# Patient Record
Sex: FEMALE | Race: BLACK OR AFRICAN AMERICAN | NOT HISPANIC OR LATINO | Employment: OTHER | URBAN - METROPOLITAN AREA
[De-identification: names, ages, dates, MRNs, and addresses within clinical notes are randomized per-mention and may not be internally consistent; named-entity substitution may affect disease eponyms.]

---

## 2018-01-02 ENCOUNTER — HOSPITAL ENCOUNTER (EMERGENCY)
Facility: HOSPITAL | Age: 73
Discharge: HOME | End: 2018-01-02
Attending: EMERGENCY MEDICINE
Payer: MEDICARE

## 2018-01-02 VITALS
DIASTOLIC BLOOD PRESSURE: 81 MMHG | HEART RATE: 63 BPM | SYSTOLIC BLOOD PRESSURE: 132 MMHG | TEMPERATURE: 99 F | OXYGEN SATURATION: 99 % | RESPIRATION RATE: 20 BRPM

## 2018-01-02 DIAGNOSIS — M43.6 RIGHT TORTICOLLIS: Primary | ICD-10-CM

## 2018-01-02 PROCEDURE — 96372 THER/PROPH/DIAG INJ SC/IM: CPT

## 2018-01-02 PROCEDURE — 63600175 PHARM REV CODE 636 W HCPCS: Performed by: EMERGENCY MEDICINE

## 2018-01-02 PROCEDURE — 99284 EMERGENCY DEPT VISIT MOD MDM: CPT | Mod: 25

## 2018-01-02 RX ORDER — NAPROXEN 500 MG/1
500 TABLET ORAL 2 TIMES DAILY PRN
Qty: 14 TABLET | Refills: 0 | Status: SHIPPED | OUTPATIENT
Start: 2018-01-02 | End: 2018-01-09

## 2018-01-02 RX ORDER — KETOROLAC TROMETHAMINE 30 MG/ML
30 INJECTION, SOLUTION INTRAMUSCULAR; INTRAVENOUS
Status: COMPLETED | OUTPATIENT
Start: 2018-01-02 | End: 2018-01-02

## 2018-01-02 RX ORDER — ORPHENADRINE CITRATE 100 MG/1
100 TABLET, EXTENDED RELEASE ORAL 2 TIMES DAILY PRN
Qty: 14 TABLET | Refills: 0 | Status: SHIPPED | OUTPATIENT
Start: 2018-01-02 | End: 2018-01-09

## 2018-01-02 RX ORDER — ORPHENADRINE CITRATE 30 MG/ML
60 INJECTION INTRAMUSCULAR; INTRAVENOUS
Status: COMPLETED | OUTPATIENT
Start: 2018-01-02 | End: 2018-01-02

## 2018-01-02 RX ADMIN — KETOROLAC TROMETHAMINE 30 MG: 30 INJECTION, SOLUTION INTRAMUSCULAR at 01:01

## 2018-01-02 RX ADMIN — ORPHENADRINE CITRATE 60 MG: 30 INJECTION INTRAMUSCULAR; INTRAVENOUS at 01:01

## 2018-01-02 NOTE — ED PROVIDER NOTES
Encounter Date: 1/2/2018       History     Chief Complaint   Patient presents with    Neck Pain     x 1 week; has not taken any medication for her pain     Right lateral neck pain and stiffness on awakening a few days ago, has been spasmed and tender ever since.  Has not taken any medications.  No neurologic symptoms.  No trauma.  He denies fall, motor vehicle accident, etc.  He has had similar episodes in the past.  No other complaints.  Here by ambulance, has family coming to pick her up.      The history is provided by the patient and the EMS personnel. No  was used.     Review of patient's allergies indicates:   Allergen Reactions    Acidophilus-pectin, citrus Itching    Codeine Itching     Past Medical History:   Diagnosis Date    Depression     GERD (gastroesophageal reflux disease)     HLD (hyperlipidemia)     Hypertension      Past Surgical History:   Procedure Laterality Date    APPENDECTOMY      CARPAL TUNNEL RELEASE      HYSTERECTOMY      SHOULDER SURGERY       Family History   Problem Relation Age of Onset    Colon cancer Neg Hx      Social History   Substance Use Topics    Smoking status: Never Smoker    Smokeless tobacco: Not on file    Alcohol use No     Review of Systems   Musculoskeletal: Positive for neck pain and neck stiffness.       Physical Exam     Initial Vitals [01/02/18 0059]   BP Pulse Resp Temp SpO2   (!) 158/85 65 20 99 °F (37.2 °C) 98 %      MAP       109.33         Physical Exam    Nursing note and vitals reviewed.  Constitutional: She appears well-developed and well-nourished. She is not diaphoretic. No distress.   HENT:   Head: Normocephalic and atraumatic.   Mouth/Throat: No oropharyngeal exudate.   Eyes: Conjunctivae and EOM are normal. Pupils are equal, round, and reactive to light. Right eye exhibits no discharge. Left eye exhibits no discharge. No scleral icterus.   Neck: Normal range of motion. Neck supple. No thyromegaly present. No tracheal  deviation present. No JVD present.   Cardiovascular: Normal rate, regular rhythm, normal heart sounds and intact distal pulses. Exam reveals no gallop and no friction rub.    No murmur heard.  Pulmonary/Chest: Breath sounds normal. No stridor. No respiratory distress. She has no wheezes. She has no rhonchi. She has no rales. She exhibits no tenderness.   Abdominal: Soft. Bowel sounds are normal. She exhibits no distension and no mass. There is no tenderness. There is no rebound and no guarding.   Musculoskeletal: She exhibits tenderness. She exhibits no edema.   Moderate right lateral cervical muscular spasm & tenderness with associated decreased ROM due to pain & spasm; no other findings.   Neurological: She is alert and oriented to person, place, and time. She has normal strength.   Skin: Skin is warm and dry. No rash and no abscess noted. No erythema.   Psychiatric: She has a normal mood and affect. Her behavior is normal. Judgment and thought content normal.         ED Course   Procedures  Labs Reviewed - No data to display                            ED Course      Clinical Impression:     1. Right torticollis          Disposition:   Disposition: Discharged  Condition: Stable                        Keshawn Hobbs MD  01/02/18 0110

## 2018-01-02 NOTE — ED NOTES
LOC: The patient is awake, alert and aware of environment with an appropriate affect, the patient is oriented x 3 and speaking appropriately.  APPEARANCE: Patient resting comfortably and in no acute distress, patient is clean and well groomed, patient's clothing is properly fastened.  HEENT: Brief WNL  SKIN: Brief WNL.   MUSCULOSKELETAL: Brief WNL. stiiffness to neck pain with movement. Pt ambulating from stretcher to room without difficulty  RESPIRATORY: Brief WNL  CARDIAC: Brief WNL  GASTRO: Brief WNL  : Brief WNL  Peripheral Vasc: Brief WNL  NEURO: Brief WNL. aaox3 perrla  PSYCH: Brief WNL

## 2018-06-20 ENCOUNTER — TELEPHONE (OUTPATIENT)
Dept: RADIOLOGY | Facility: HOSPITAL | Age: 73
End: 2018-06-20

## 2018-06-21 ENCOUNTER — HOSPITAL ENCOUNTER (OUTPATIENT)
Dept: RADIOLOGY | Facility: HOSPITAL | Age: 73
Discharge: HOME | End: 2018-06-21
Attending: FAMILY MEDICINE
Payer: MEDICARE

## 2018-06-21 DIAGNOSIS — R10.9 STOMACH ACHE: Primary | ICD-10-CM

## 2018-06-21 DIAGNOSIS — R10.9 STOMACH ACHE: ICD-10-CM

## 2018-06-21 PROCEDURE — 76700 US EXAM ABDOM COMPLETE: CPT | Mod: TC,PO

## 2018-06-21 PROCEDURE — 76700 US EXAM ABDOM COMPLETE: CPT | Mod: 26,,, | Performed by: RADIOLOGY

## 2019-09-30 ENCOUNTER — OFFICE VISIT (OUTPATIENT)
Dept: GASTROENTEROLOGY | Facility: CLINIC | Age: 74
End: 2019-09-30
Payer: MEDICARE

## 2019-09-30 VITALS
DIASTOLIC BLOOD PRESSURE: 70 MMHG | SYSTOLIC BLOOD PRESSURE: 130 MMHG | HEIGHT: 60 IN | WEIGHT: 148.38 LBS | BODY MASS INDEX: 29.13 KG/M2 | HEART RATE: 59 BPM

## 2019-09-30 DIAGNOSIS — K59.04 CHRONIC IDIOPATHIC CONSTIPATION: ICD-10-CM

## 2019-09-30 DIAGNOSIS — Z12.11 COLON CANCER SCREENING: Primary | ICD-10-CM

## 2019-09-30 DIAGNOSIS — Z86.010 HISTORY OF COLON POLYPS: ICD-10-CM

## 2019-09-30 PROCEDURE — 99213 OFFICE O/P EST LOW 20 MIN: CPT | Mod: PBBFAC | Performed by: NURSE PRACTITIONER

## 2019-09-30 PROCEDURE — 99999 PR PBB SHADOW E&M-EST. PATIENT-LVL III: CPT | Mod: PBBFAC,,, | Performed by: NURSE PRACTITIONER

## 2019-09-30 PROCEDURE — 99999 PR PBB SHADOW E&M-EST. PATIENT-LVL III: ICD-10-PCS | Mod: PBBFAC,,, | Performed by: NURSE PRACTITIONER

## 2019-09-30 PROCEDURE — 99203 PR OFFICE/OUTPT VISIT, NEW, LEVL III, 30-44 MIN: ICD-10-PCS | Mod: S$PBB,,, | Performed by: NURSE PRACTITIONER

## 2019-09-30 PROCEDURE — 99203 OFFICE O/P NEW LOW 30 MIN: CPT | Mod: S$PBB,,, | Performed by: NURSE PRACTITIONER

## 2019-09-30 RX ORDER — MELOXICAM 7.5 MG/1
7.5 TABLET ORAL 2 TIMES DAILY
COMMUNITY
End: 2019-12-19 | Stop reason: SDUPTHER

## 2019-09-30 RX ORDER — NAPROXEN SODIUM 220 MG/1
81 TABLET, FILM COATED ORAL DAILY
COMMUNITY

## 2019-09-30 RX ORDER — ISOSORBIDE MONONITRATE 30 MG/1
30 TABLET, EXTENDED RELEASE ORAL DAILY
COMMUNITY
End: 2019-12-04 | Stop reason: SDUPTHER

## 2019-09-30 RX ORDER — OMEPRAZOLE 20 MG/1
20 CAPSULE, DELAYED RELEASE ORAL DAILY
COMMUNITY
End: 2019-11-30 | Stop reason: SDUPTHER

## 2019-09-30 NOTE — PROGRESS NOTES
Clinic Consult:  [facility] Consultation Note    Reason for Consult:  The primary encounter diagnosis was Colon cancer screening. Diagnoses of History of colon polyps and Chronic idiopathic constipation were also pertinent to this visit.    PCP: Carlton Calderon   [id number] [facility] / [place]*    HPI:  This is a 74 y.o. female here for evaluation of the above. She presents to clinic needing screening colonoscopy. Her last colonoscopy was done in 2012. Findings of internal hemorrhoids, diverticulosis in the sigmoid colon, a 3 mm polyp, 13 mm polyp, and a benign tumor (likely polyp) at the appendiceal orifice. This was subsequently surgically excised and found to be benign. Recall recommended in 3 years. She also has chronic constipation. She is taking Miralax every other day but is not well controlled.     Review of Systems   Constitutional: Negative for fever, malaise/fatigue and weight loss.   HENT: Negative for sore throat.    Respiratory: Negative for cough and wheezing.    Cardiovascular: Negative for chest pain and palpitations.   Gastrointestinal: Positive for constipation. Negative for abdominal pain, blood in stool, diarrhea, heartburn, melena, nausea and vomiting.   Genitourinary: Negative for dysuria and frequency.   Musculoskeletal: Negative for back pain, joint pain, myalgias and neck pain.   Skin: Negative for itching and rash.   Neurological: Negative for dizziness, speech change, seizures, loss of consciousness and headaches.   Psychiatric/Behavioral: Negative for depression and substance abuse. The patient is not nervous/anxious.        Medical History:  has a past medical history of Depression, GERD (gastroesophageal reflux disease), HLD (hyperlipidemia), and Hypertension.    Surgical History:  has a past surgical history that includes Hysterectomy; Appendectomy; Carpal tunnel release; and Shoulder surgery.    Family History: family history is not on file..      Social History:  reports that she has never smoked. She does not have any smokeless tobacco history on file. She reports that she does not drink alcohol or use drugs.    Allergies: Reviewed    Home Medications:   Current Outpatient Medications on File Prior to Visit   Medication Sig Dispense Refill    aluminum-magnesium hydroxide-simethicone (MAALOX) 200-200-20 mg/5 mL Susp Take 30 mLs by mouth 4 (four) times daily before meals and nightly.      aspirin 81 MG Chew Take 81 mg by mouth once daily.      atorvastatin (LIPITOR) 20 MG tablet Take 20 mg by mouth every evening.      fluticasone (VERAMYST) 27.5 mcg/actuation nasal spray 2 sprays by Nasal route once daily.      isosorbide mononitrate (IMDUR) 30 MG 24 hr tablet Take 30 mg by mouth once daily.      latanoprost 0.005 % ophthalmic solution 1 drop every evening.      meloxicam (MOBIC) 7.5 MG tablet Take 7.5 mg by mouth 2 (two) times daily.      omeprazole (PRILOSEC) 20 MG capsule Take 20 mg by mouth once daily.      paroxetine (PAXIL) 20 MG tablet Take 20 mg by mouth every morning.      valsartan (DIOVAN) 80 MG tablet Take 80 mg by mouth once daily.      verapamil (VERELAN) 240 MG C24P Take 240 mg by mouth once daily.      pantoprazole (PROTONIX) 40 MG tablet Take 1 tablet (40 mg total) by mouth before breakfast. Take every morning on an empty stomach 30 mins before eating breakfast 30 tablet 11     No current facility-administered medications on file prior to visit.        Physical Exam:  /70   Pulse (!) 59   Ht 5' (1.524 m)   Wt 67.3 kg (148 lb 5.9 oz)   BMI 28.98 kg/m²   Body mass index is 28.98 kg/m².  Physical Exam   Constitutional: She is oriented to person, place, and time and well-developed, well-nourished, and in no distress. No distress.   HENT:   Head: Normocephalic.   Eyes: Pupils are equal, round, and reactive to light. Conjunctivae are normal.   Cardiovascular: Normal rate, regular rhythm and normal heart sounds.    Pulmonary/Chest: Effort normal and breath sounds normal. No respiratory distress.   Abdominal: Soft. Bowel sounds are normal. She exhibits no distension. There is no tenderness.   Neurological: She is alert and oriented to person, place, and time. No cranial nerve deficit.   Skin: Skin is warm and dry. No rash noted.   Psychiatric: Mood and affect normal.       Labs: Pertinent labs reviewed.    Assessment:  1. Colon cancer screening    2. History of colon polyps    3. Chronic idiopathic constipation         Recommendations:  Colon cancer screening  History of colon polyps  - due for repeat screening  -     Case request GI: COLONOSCOPY    Chronic idiopathic constipation  - increase Miralax frequency to daily. Can increase to twice a day if needed.     Follow up to be determined after procedure.    Thank you so much for allowing me to participate in the care of KIM Scherer

## 2019-09-30 NOTE — LETTER
September 30, 2019      Carlton Calderon MD  [address] [facility] [place] [address]           [facility]  [id number] [place] [id number]  Phone: [phone]  Fax: [phone]          Patient: Odalis Rosas   MR Number: [id number]   YOB: 1945   Date of Visit: 9/30/2019       Dear Dr. Carlton Calderon:    Thank you for referring Oadlis Rosas to me for evaluation. Attached you will find relevant portions of my assessment and plan of care.    If you have questions, please do not hesitate to call me. I look forward to following Odalis Rosas along with you.    Sincerely,    Mayra Jaimes, DOMONIQUE    Enclosure  CC:  No Recipients    If you would like to receive this communication electronically, please contact [email or web address] or [phone] to request more information on [employer] access.    For providers and/or their staff who would like to refer a patient to [facility], please contact us through our one-stop-shop provider referral line, [facility] , at [phone].    If you feel you have received this communication in error or would no longer like to receive these types of communications, please e-mail [email or web address]

## 2019-10-09 ENCOUNTER — HOSPITAL ENCOUNTER (OUTPATIENT)
Dept: RADIOLOGY | Facility: HOSPITAL | Age: 74
Discharge: HOME | End: 2019-10-09
Attending: FAMILY MEDICINE
Payer: MEDICARE

## 2019-10-09 VITALS — HEIGHT: 60 IN | WEIGHT: 147.69 LBS | BODY MASS INDEX: 28.99 KG/M2

## 2019-10-09 DIAGNOSIS — Z12.31 ENCOUNTER FOR SCREENING MAMMOGRAM FOR MALIGNANT NEOPLASM OF BREAST: ICD-10-CM

## 2019-10-09 PROCEDURE — 77067 MAMMO DIGITAL SCREENING BILAT WITH TOMOSYNTHESIS_CAD: ICD-10-PCS | Mod: 26,,, | Performed by: RADIOLOGY

## 2019-10-09 PROCEDURE — 77067 SCR MAMMO BI INCL CAD: CPT | Mod: TC,PO

## 2019-10-09 PROCEDURE — 77067 SCR MAMMO BI INCL CAD: CPT | Mod: 26,,, | Performed by: RADIOLOGY

## 2019-10-09 PROCEDURE — 77063 MAMMO DIGITAL SCREENING BILAT WITH TOMOSYNTHESIS_CAD: ICD-10-PCS | Mod: 26,,, | Performed by: RADIOLOGY

## 2019-10-09 PROCEDURE — 77063 BREAST TOMOSYNTHESIS BI: CPT | Mod: 26,,, | Performed by: RADIOLOGY

## 2019-11-06 ENCOUNTER — TELEPHONE (OUTPATIENT)
Dept: ENDOSCOPY | Facility: HOSPITAL | Age: 74
End: 2019-11-06

## 2019-11-06 NOTE — TELEPHONE ENCOUNTER
Attempted to call pt in regards to her colonoscopy scheduled for 11/27/19. Due to an endo schedule change, she will need to be moved to Dr. Aragon schedule that day or rescheduled. No answer. No vm option.

## 2019-11-09 ENCOUNTER — TELEPHONE (OUTPATIENT)
Dept: ENDOSCOPY | Facility: HOSPITAL | Age: 74
End: 2019-11-09

## 2019-11-14 ENCOUNTER — TELEPHONE (OUTPATIENT)
Dept: ENDOSCOPY | Facility: HOSPITAL | Age: 74
End: 2019-11-14

## 2019-11-14 NOTE — TELEPHONE ENCOUNTER
Patient called office to reschedule procedure, d/t endoscopy schedule on 11-.  Rescheduled procedure to 1-.  New instructions mailed to address on file.

## 2019-11-27 RX ORDER — SODIUM, POTASSIUM,MAG SULFATES 17.5-3.13G
SOLUTION, RECONSTITUTED, ORAL ORAL
Qty: 354 ML | Refills: 0 | Status: ON HOLD | OUTPATIENT
Start: 2019-11-27 | End: 2019-12-04 | Stop reason: HOSPADM

## 2019-11-30 RX ORDER — OMEPRAZOLE 20 MG/1
CAPSULE, DELAYED RELEASE ORAL
Qty: 90 CAPSULE | Refills: 1 | Status: SHIPPED | OUTPATIENT
Start: 2019-11-30 | End: 2020-02-06 | Stop reason: SDUPTHER

## 2019-12-04 ENCOUNTER — ANESTHESIA (OUTPATIENT)
Dept: ENDOSCOPY | Facility: HOSPITAL | Age: 74
End: 2019-12-04
Payer: MEDICARE

## 2019-12-04 ENCOUNTER — HOSPITAL ENCOUNTER (OUTPATIENT)
Facility: HOSPITAL | Age: 74
Discharge: HOME | End: 2019-12-04
Attending: INTERNAL MEDICINE | Admitting: INTERNAL MEDICINE
Payer: MEDICARE

## 2019-12-04 ENCOUNTER — ANESTHESIA EVENT (OUTPATIENT)
Dept: ENDOSCOPY | Facility: HOSPITAL | Age: 74
End: 2019-12-04
Payer: MEDICARE

## 2019-12-04 VITALS
OXYGEN SATURATION: 100 % | BODY MASS INDEX: 28.82 KG/M2 | HEART RATE: 62 BPM | TEMPERATURE: 98 F | WEIGHT: 146.81 LBS | RESPIRATION RATE: 17 BRPM | HEIGHT: 60 IN | DIASTOLIC BLOOD PRESSURE: 92 MMHG | SYSTOLIC BLOOD PRESSURE: 128 MMHG

## 2019-12-04 DIAGNOSIS — Z86.010 PERSONAL HISTORY OF COLONIC POLYPS: Primary | ICD-10-CM

## 2019-12-04 PROBLEM — Z86.0100 PERSONAL HISTORY OF COLONIC POLYPS: Status: ACTIVE | Noted: 2019-12-04

## 2019-12-04 LAB — POCT GLUCOSE: 94 MG/DL (ref 70–110)

## 2019-12-04 PROCEDURE — 63600175 PHARM REV CODE 636 W HCPCS: Performed by: INTERNAL MEDICINE

## 2019-12-04 PROCEDURE — G0105 COLORECTAL SCRN; HI RISK IND: HCPCS | Performed by: INTERNAL MEDICINE

## 2019-12-04 PROCEDURE — 37000009 HC ANESTHESIA EA ADD 15 MINS: Performed by: INTERNAL MEDICINE

## 2019-12-04 PROCEDURE — G0105 COLORECTAL SCRN; HI RISK IND: HCPCS | Mod: ,,, | Performed by: INTERNAL MEDICINE

## 2019-12-04 PROCEDURE — G0105 COLORECTAL SCRN; HI RISK IND: ICD-10-PCS | Mod: ,,, | Performed by: INTERNAL MEDICINE

## 2019-12-04 PROCEDURE — 82962 GLUCOSE BLOOD TEST: CPT | Performed by: INTERNAL MEDICINE

## 2019-12-04 PROCEDURE — 63600175 PHARM REV CODE 636 W HCPCS: Performed by: NURSE ANESTHETIST, CERTIFIED REGISTERED

## 2019-12-04 PROCEDURE — 37000008 HC ANESTHESIA 1ST 15 MINUTES: Performed by: INTERNAL MEDICINE

## 2019-12-04 PROCEDURE — 25000003 PHARM REV CODE 250: Performed by: NURSE ANESTHETIST, CERTIFIED REGISTERED

## 2019-12-04 RX ORDER — GABAPENTIN 100 MG/1
CAPSULE ORAL
Qty: 90 CAPSULE | Refills: 1 | Status: SHIPPED | OUTPATIENT
Start: 2019-12-04 | End: 2020-11-05

## 2019-12-04 RX ORDER — LIDOCAINE HYDROCHLORIDE 10 MG/ML
INJECTION, SOLUTION EPIDURAL; INFILTRATION; INTRACAUDAL; PERINEURAL
Status: DISCONTINUED | OUTPATIENT
Start: 2019-12-04 | End: 2019-12-04

## 2019-12-04 RX ORDER — PROPOFOL 10 MG/ML
VIAL (ML) INTRAVENOUS
Status: DISCONTINUED | OUTPATIENT
Start: 2019-12-04 | End: 2019-12-04

## 2019-12-04 RX ORDER — SODIUM CHLORIDE 0.9 % (FLUSH) 0.9 %
10 SYRINGE (ML) INJECTION
Status: DISCONTINUED | OUTPATIENT
Start: 2019-12-04 | End: 2019-12-04 | Stop reason: HOSPADM

## 2019-12-04 RX ORDER — SODIUM CHLORIDE, SODIUM LACTATE, POTASSIUM CHLORIDE, CALCIUM CHLORIDE 600; 310; 30; 20 MG/100ML; MG/100ML; MG/100ML; MG/100ML
INJECTION, SOLUTION INTRAVENOUS CONTINUOUS
Status: DISCONTINUED | OUTPATIENT
Start: 2019-12-04 | End: 2019-12-04 | Stop reason: HOSPADM

## 2019-12-04 RX ORDER — LEVOCETIRIZINE DIHYDROCHLORIDE 5 MG/1
TABLET, FILM COATED ORAL
Qty: 90 TABLET | Refills: 1 | Status: SHIPPED | OUTPATIENT
Start: 2019-12-04 | End: 2020-04-03 | Stop reason: SDUPTHER

## 2019-12-04 RX ORDER — ISOSORBIDE MONONITRATE 30 MG/1
TABLET, EXTENDED RELEASE ORAL
Qty: 90 TABLET | Refills: 1 | Status: SHIPPED | OUTPATIENT
Start: 2019-12-04 | End: 2020-04-03 | Stop reason: SDUPTHER

## 2019-12-04 RX ADMIN — PROPOFOL 20 MG: 10 INJECTION, EMULSION INTRAVENOUS at 07:12

## 2019-12-04 RX ADMIN — PROPOFOL 20 MG: 10 INJECTION, EMULSION INTRAVENOUS at 06:12

## 2019-12-04 RX ADMIN — SODIUM CHLORIDE, SODIUM LACTATE, POTASSIUM CHLORIDE, AND CALCIUM CHLORIDE: 600; 310; 30; 20 INJECTION, SOLUTION INTRAVENOUS at 06:12

## 2019-12-04 RX ADMIN — PROPOFOL 50 MG: 10 INJECTION, EMULSION INTRAVENOUS at 06:12

## 2019-12-04 RX ADMIN — LIDOCAINE HYDROCHLORIDE 50 MG: 10 INJECTION, SOLUTION EPIDURAL; INFILTRATION; INTRACAUDAL; PERINEURAL at 06:12

## 2019-12-04 NOTE — TRANSFER OF CARE
Anesthesia Transfer of Care Note    Patient: Odalis Rosas    Procedure(s) Performed: Procedure(s) (LRB):  COLONOSCOPY (N/A)    Patient location: GI    Anesthesia Type: MAC    Transport from OR: Transported from OR on room air with adequate spontaneous ventilation    Post pain: adequate analgesia    Post assessment: no apparent anesthetic complications    Post vital signs: stable    Level of consciousness: awake    Nausea/Vomiting: no nausea/vomiting    Complications: none    Transfer of care protocol was followed      Last vitals:   Visit Vitals  /83 (BP Location: Left arm, Patient Position: Lying)   Pulse (!) 56   Resp 18   Ht 5' (1.524 m)   Wt 66.6 kg (146 lb 13.2 oz)   SpO2 100%   Breastfeeding? No   BMI 28.68 kg/m²

## 2019-12-04 NOTE — ANESTHESIA RELEASE NOTE
Anesthesia Release from PACU Note    Patient: Odalis Rosas    Procedure(s) Performed: Procedure(s) (LRB):  COLONOSCOPY (N/A)    Anesthesia type: MAC    Post pain: Adequate analgesia    Post assessment: no apparent anesthetic complications    Last Vitals:   Visit Vitals  /83 (BP Location: Left arm, Patient Position: Lying)   Pulse (!) 56   Resp 18   Ht 5' (1.524 m)   Wt 66.6 kg (146 lb 13.2 oz)   SpO2 100%   Breastfeeding? No   BMI 28.68 kg/m²       Post vital signs: stable    Level of consciousness: awake    Nausea/Vomiting: no nausea/no vomiting    Complications: none    Airway Patency: patent    Respiratory: unassisted    Cardiovascular: stable and blood pressure at baseline    Hydration: euvolemic

## 2019-12-04 NOTE — DISCHARGE INSTRUCTIONS

## 2019-12-04 NOTE — H&P
Short Stay Endoscopy History and Physical    PCP - Carlton Calderon MD    Procedure - Colonoscopy  ASA - 2  Mallampati - per anesthesia  History of Anesthesia problems - no  Family history Anesthesia problems -  no     HPI:  This is a 74 y.o. female here for evaluation of :   Active Hospital Problems    Diagnosis  POA    *Personal history of colonic polyps [Z86.010]  Not Applicable      Resolved Hospital Problems   No resolved problems to display.         Health Maintenance       Date Due Completion Date    Hepatitis C Screening 1945 ---    Lipid Panel 1945 ---    TETANUS VACCINE 09/26/1965 ---    DEXA SCAN 09/26/1987 ---    Shingles Vaccine (1 of 2) 09/26/1997 ---    Pneumococcal Vaccine (65+ Low/Medium Risk) (1 of 2 - PCV13) 09/26/2012 ---    Colonoscopy 07/13/2019 7/13/2016    Mammogram 10/09/2021 10/9/2019          Screening - no  History of polyps - yes  Diarrhea - no  Anemia - no  Blood in stools - no  Abdominal pain - no  Family History of Colon Cancer- no  Other - no    ROS:  CONSTITUTIONAL: Denies weight change,  fatigue, fevers, chills, night sweats.  CARDIOVASCULAR: Denies chest pain, shortness of breath, orthopnea and edema.  RESPIRATORY: Denies cough, hemoptysis, dyspnea, and wheezing.  GI: See HPI.    Medical History:   Past Medical History:   Diagnosis Date    Depression     GERD (gastroesophageal reflux disease)     HLD (hyperlipidemia)     Hypertension        Surgical History:   Past Surgical History:   Procedure Laterality Date    APPENDECTOMY      BREAST BIOPSY      CARPAL TUNNEL RELEASE      HYSTERECTOMY      SHOULDER SURGERY         Family History:   Family History   Problem Relation Age of Onset    Colon cancer Neg Hx        Social History:   Social History     Tobacco Use    Smoking status: Never Smoker    Smokeless tobacco: Never Used   Substance Use Topics    Alcohol use: No    Drug use: No       Allergies:   Review of patient's allergies indicates:   Allergen  Reactions    Acidophilus-pectin, citrus Itching    Codeine Itching       Medications:   No current facility-administered medications on file prior to encounter.      Current Outpatient Medications on File Prior to Encounter   Medication Sig Dispense Refill    aluminum-magnesium hydroxide-simethicone (MAALOX) 200-200-20 mg/5 mL Susp Take 30 mLs by mouth 4 (four) times daily before meals and nightly.      aspirin 81 MG Chew Take 81 mg by mouth once daily.      atorvastatin (LIPITOR) 20 MG tablet Take 20 mg by mouth every evening.      fluticasone (VERAMYST) 27.5 mcg/actuation nasal spray 2 sprays by Nasal route once daily.      isosorbide mononitrate (IMDUR) 30 MG 24 hr tablet Take 30 mg by mouth once daily.      latanoprost 0.005 % ophthalmic solution 1 drop every evening.      paroxetine (PAXIL) 20 MG tablet Take 20 mg by mouth every morning.      valsartan (DIOVAN) 80 MG tablet Take 80 mg by mouth once daily.      verapamil (VERELAN) 240 MG C24P Take 240 mg by mouth once daily.      meloxicam (MOBIC) 7.5 MG tablet Take 7.5 mg by mouth 2 (two) times daily.      pantoprazole (PROTONIX) 40 MG tablet Take 1 tablet (40 mg total) by mouth before breakfast. Take every morning on an empty stomach 30 mins before eating breakfast 30 tablet 11       Physical Exam:  Vital Signs:   Vitals:    12/04/19 0636   BP: 129/83   Pulse: (!) 56   Resp: 18     General Appearance: Well appearing in no acute distress  ENT: OP clear  Chest: CTA B  CV: RRR, no m/r/g  Abd: s/nt/nd/nabs  Ext: no edema    Labs:Reviewed    IMP:  Active Hospital Problems    Diagnosis  POA    *Personal history of colonic polyps [Z86.010]  Not Applicable      Resolved Hospital Problems   No resolved problems to display.         Plan:   I have explained the risks and benefits of colonoscopy to the patient including but not limited to bleeding, perforation, infection, and death. The patient wishes to proceed.

## 2019-12-04 NOTE — DISCHARGE SUMMARY
Endoscopy Discharge Summary      Admit Date: 12/4/2019    Discharge Date and Time:  12/4/2019 7:12 AM    Attending Physician: Lyubov Root MD     Discharge Physician: Lyubov Root MD     Principal Admitting Diagnoses: Personal history of colonic polyps         Discharge Diagnosis: The encounter diagnosis was Personal history of colonic polyps.     Discharged Condition: Good    Indication for Admission: Personal history of colonic polyps     Hospital Course: Patient was admitted for an inpatient procedure and tolerated the procedure well with no complications.    Significant Diagnostic Studies: Colonoscopy    Pathology (if any):  Specimen (12h ago, onward)    None          Estimated Blood Loss: 0 ml.    Discussed with: patient.    Disposition: Home.    Follow Up/Patient Instructions:   Current Discharge Medication List      CONTINUE these medications which have NOT CHANGED    Details   aluminum-magnesium hydroxide-simethicone (MAALOX) 200-200-20 mg/5 mL Susp Take 30 mLs by mouth 4 (four) times daily before meals and nightly.      aspirin 81 MG Chew Take 81 mg by mouth once daily.      atorvastatin (LIPITOR) 20 MG tablet Take 20 mg by mouth every evening.      fluticasone (VERAMYST) 27.5 mcg/actuation nasal spray 2 sprays by Nasal route once daily.      isosorbide mononitrate (IMDUR) 30 MG 24 hr tablet Take 30 mg by mouth once daily.      latanoprost 0.005 % ophthalmic solution 1 drop every evening.      omeprazole (PRILOSEC) 20 MG capsule TAKE ONE CAPSULE BY MOUTH ONCE DAILY BEFORE BREAKFAST  Qty: 90 capsule, Refills: 1      paroxetine (PAXIL) 20 MG tablet Take 20 mg by mouth every morning.      valsartan (DIOVAN) 80 MG tablet Take 80 mg by mouth once daily.      verapamil (VERELAN) 240 MG C24P Take 240 mg by mouth once daily.      meloxicam (MOBIC) 7.5 MG tablet Take 7.5 mg by mouth 2 (two) times daily.      pantoprazole (PROTONIX) 40 MG tablet Take 1 tablet (40 mg total) by mouth before breakfast.  Take every morning on an empty stomach 30 mins before eating breakfast  Qty: 30 tablet, Refills: 11    Associated Diagnoses: Chronic idiopathic constipation; GERD (gastroesophageal reflux disease); Epigastric pain; Epigastric abdominal tenderness         STOP taking these medications       sodium,potassium,mag sulfates (SUPREP BOWEL PREP KIT) 17.5-3.13-1.6 gram SolR Comments:   Reason for Stopping:               No discharge procedures on file.        Lyubov Root MD  Gastroenterology and Hepatology

## 2019-12-04 NOTE — [employer] PATIENT INSTRUCTIONS
Discharge Summary/Instructions after an Endoscopic Procedure  Patient Name: Odalis Rosas  Patient MRN: [id number]  Patient YOB: 1945 Wednesday, December 04, 2019 Lyubov Root MD  RESTRICTIONS:  During your procedure today, you received medications for sedation.  These   medications may affect your judgment, balance and coordination.  Therefore,   for 24 hours, you have the following restrictions:   - DO NOT drive a car, operate machinery, make legal/financial decisions,   sign important papers or drink alcohol.    ACTIVITY:  Today: no heavy lifting, straining or running due to procedural   sedation/anesthesia.  The following day: return to full activity including work.  DIET:  Eat and drink normally unless instructed otherwise.     TREATMENT FOR COMMON SIDE EFFECTS:  - Mild abdominal pain, nausea, belching, bloating or excessive gas:  rest,   eat lightly and use a heating pad.  - Sore Throat: treat with throat lozenges and/or gargle with warm salt   water.  - Because air was used during the procedure, expelling large amounts of air   from your rectum or belching is normal.  - If a bowel prep was taken, you may not have a bowel movement for 1-3 days.    This is normal.  SYMPTOMS TO WATCH FOR AND REPORT TO YOUR PHYSICIAN:  1. Abdominal pain or bloating, other than gas cramps.  2. Chest pain.  3. Back pain.  4. Signs of infection such as: chills or fever occurring within 24 hours   after the procedure.  5. Rectal bleeding, which would show as bright red, maroon, or black stools.   (A tablespoon of blood from the rectum is not serious, especially if   hemorrhoids are present.)  6. Vomiting.  7. Weakness or dizziness.  GO DIRECTLY TO THE NEAREST EMERGENCY ROOM IF YOU HAVE ANY OF THE FOLLOWING:      Difficulty breathing              Chills and/or fever over 101 F   Persistent vomiting and/or vomiting blood   Severe abdominal pain   Severe chest pain   Black, tarry stools   Bleeding- more than one  tablespoon   Any other symptom or condition that you feel may need urgent attention  Your doctor recommends these additional instructions:  If any biopsies were taken, your doctors clinic will contact you in 1 to 2   weeks with any results.  - Discharge patient to home (via wheelchair).   - High fiber diet.   - Continue present medications.   - Repeat colonoscopy in 5 years for surveillance.   - Patient has a contact number available for emergencies.  The signs and   symptoms of potential delayed complications were discussed with the   patient.  Return to normal activities tomorrow.  Written discharge   instructions were provided to the patient.  For questions, problems or results please call your physician Lyubov Root MD at Work:  [phone]  If you have any questions about the above instructions, call the GI   department at [phone] or call the endoscopy unit at [phone]   from 7am until 3 pm.  [facility], EMERGENCY ROOM PHONE NUMBER:   [phone]  IF A COMPLICATION OR EMERGENCY SITUATION ARISES AND YOU ARE UNABLE TO REACH   YOUR PHYSICIAN - GO DIRECTLY TO THE EMERGENCY ROOM.  I have read or have had read to me these discharge instructions for my   procedure and have received a written copy.  I understand these   instructions and will follow-up with my physician if I have any questions.     __________________________________       _____________________________________  Nurse Signature                                          Patient/Designated   Responsible Party Signature  MD Lyubov Dumont MD  12/4/2019 7:11:27 AM  [employer]

## 2019-12-04 NOTE — ANESTHESIA PREPROCEDURE EVALUATION
12/04/2019  Odalis Rosas is a 74 y.o., female.    Anesthesia Evaluation    I have reviewed the Patient Summary Reports.     I have reviewed the Medications.     Review of Systems  Anesthesia Hx:  No problems with previous Anesthesia  History of prior surgery of interest to airway management or planning: Denies Family Hx of Anesthesia complications.   Denies Personal Hx of Anesthesia complications.   Social:  Non-Smoker    Hematology/Oncology:  Hematology Normal   Oncology Normal     Cardiovascular:   Hypertension hyperlipidemia    Pulmonary:  Pulmonary Normal    Hepatic/GI:   GERD    Musculoskeletal:  Musculoskeletal Normal    Neurological:  Neurology Normal    Endocrine:  Endocrine Normal    Psych:   Psychiatric History depression          Physical Exam  General:  Well nourished    Airway/Jaw/Neck:  Airway Findings: Mouth Opening: Normal Tongue: Normal  General Airway Assessment: Adult  Mallampati: II          Chest/Lungs:  Chest/Lungs Findings: Normal Respiratory Rate     Heart/Vascular:  Heart Findings: Rate: Normal             Anesthesia Plan  Type of Anesthesia, risks & benefits discussed:  Anesthesia Type:  MAC  Patient's Preference:   Intra-op Monitoring Plan: standard ASA monitors  Intra-op Monitoring Plan Comments:   Post Op Pain Control Plan:   Post Op Pain Control Plan Comments:   Induction:   IV  Beta Blocker:  Patient is not currently on a Beta-Blocker (No further documentation required).       Informed Consent: Patient understands risks and agrees with Anesthesia plan.  Questions answered. Anesthesia consent signed with patient.  ASA Score: 2     Day of Surgery Review of History & Physical:    H&P update referred to the surgeon.         Ready For Surgery From Anesthesia Perspective.

## 2019-12-04 NOTE — PLAN OF CARE
Dr Root came to bedside and discussed findings. NO N/V,  no abdominal pain, no GI bleeding, and vitals stable.  Pt discharged from unit.

## 2019-12-04 NOTE — ANESTHESIA POSTPROCEDURE EVALUATION
Anesthesia Post Evaluation    Patient: Odalis Rosas    Procedure(s) Performed: Procedure(s) (LRB):  COLONOSCOPY (N/A)    Final Anesthesia Type: MAC    Patient location during evaluation: PACU  Patient participation: Yes- Able to Participate  Level of consciousness: awake and alert  Post-procedure vital signs: reviewed and stable  Pain management: adequate  Airway patency: patent    PONV status at discharge: No PONV  Anesthetic complications: no      Cardiovascular status: blood pressure returned to baseline  Respiratory status: unassisted  Hydration status: euvolemic  Follow-up not needed.          Vitals Value Taken Time   /83 12/4/2019  6:36 AM   Temp 36.2 12/4/2019  7:12 AM   Pulse 56 12/4/2019  6:36 AM   Resp 18 12/4/2019  6:36 AM   SpO2 100 % 12/4/2019  6:36 AM         No case tracking events are documented in the log.      Pain/Shannon Score: No data recorded

## 2019-12-19 PROBLEM — M19.90 ARTHRITIS: Status: ACTIVE | Noted: 2019-12-19

## 2019-12-19 PROBLEM — E78.5 HYPERLIPIDEMIA: Status: ACTIVE | Noted: 2019-12-19

## 2019-12-19 PROBLEM — I10 HYPERTENSION: Status: ACTIVE | Noted: 2019-12-19

## 2019-12-19 PROBLEM — J30.9 ALLERGIC RHINITIS: Status: ACTIVE | Noted: 2019-12-19

## 2019-12-19 PROBLEM — K21.9 GERD (GASTROESOPHAGEAL REFLUX DISEASE): Status: ACTIVE | Noted: 2019-12-19

## 2019-12-19 PROBLEM — F32.A DEPRESSION: Status: ACTIVE | Noted: 2019-12-19

## 2019-12-19 PROBLEM — E55.9 VITAMIN D DEFICIENCY: Status: ACTIVE | Noted: 2019-12-19

## 2020-02-06 PROBLEM — E11.42 DIABETIC POLYNEUROPATHY ASSOCIATED WITH TYPE 2 DIABETES MELLITUS: Status: ACTIVE | Noted: 2020-02-06

## 2020-03-09 PROBLEM — F33.0 MILD EPISODE OF RECURRENT MAJOR DEPRESSIVE DISORDER: Status: ACTIVE | Noted: 2020-03-09

## 2020-03-19 PROBLEM — M54.50 LUMBAGO: Status: ACTIVE | Noted: 2020-03-19

## 2020-05-19 PROBLEM — R41.9 COGNITIVE COMPLAINTS WITH NORMAL EXAM: Status: ACTIVE | Noted: 2020-05-19

## 2020-10-19 ENCOUNTER — TELEPHONE (OUTPATIENT)
Dept: GASTROENTEROLOGY | Facility: CLINIC | Age: 75
End: 2020-10-19

## 2020-10-19 NOTE — TELEPHONE ENCOUNTER
----- Message from Madiha Juarez sent at 10/19/2020  1:37 PM CDT -----  Regarding: colon  Pt request call back to verify if she had colonoscopy, pt received letter ... call back : [phone] (home)

## 2020-10-19 NOTE — TELEPHONE ENCOUNTER
Spoke with patient and informed her that her last Colonoscopy was 12/2019 and she does not need to repeat for another 5  Years.

## 2020-12-17 ENCOUNTER — HOSPITAL ENCOUNTER (EMERGENCY)
Facility: HOSPITAL | Age: 75
Discharge: HOME | End: 2020-12-17
Attending: EMERGENCY MEDICINE
Payer: COMMERCIAL

## 2020-12-17 VITALS
HEART RATE: 76 BPM | TEMPERATURE: 98 F | HEIGHT: 60 IN | RESPIRATION RATE: 17 BRPM | WEIGHT: 149.94 LBS | SYSTOLIC BLOOD PRESSURE: 123 MMHG | DIASTOLIC BLOOD PRESSURE: 76 MMHG | OXYGEN SATURATION: 96 % | BODY MASS INDEX: 29.44 KG/M2

## 2020-12-17 DIAGNOSIS — R07.9 CHEST PAIN, UNSPECIFIED TYPE: Primary | ICD-10-CM

## 2020-12-17 LAB
ALBUMIN SERPL BCP-MCNC: 3.9 G/DL (ref 3.5–5.2)
ALP SERPL-CCNC: 131 U/L (ref 55–135)
ALT SERPL W/O P-5'-P-CCNC: 23 U/L (ref 10–44)
ANION GAP SERPL CALC-SCNC: 11 MMOL/L (ref 8–16)
AST SERPL-CCNC: 29 U/L (ref 10–40)
BASOPHILS # BLD AUTO: 0.04 K/UL (ref 0–0.2)
BASOPHILS NFR BLD: 0.8 % (ref 0–1.9)
BILIRUB SERPL-MCNC: 0.5 MG/DL (ref 0.1–1)
BUN SERPL-MCNC: 15 MG/DL (ref 8–23)
CALCIUM SERPL-MCNC: 9.6 MG/DL (ref 8.7–10.5)
CHLORIDE SERPL-SCNC: 104 MMOL/L (ref 95–110)
CO2 SERPL-SCNC: 27 MMOL/L (ref 23–29)
CREAT SERPL-MCNC: 1 MG/DL (ref 0.5–1.4)
DIFFERENTIAL METHOD: NORMAL
EOSINOPHIL # BLD AUTO: 0.2 K/UL (ref 0–0.5)
EOSINOPHIL NFR BLD: 4.9 % (ref 0–8)
ERYTHROCYTE [DISTWIDTH] IN BLOOD BY AUTOMATED COUNT: 13.7 % (ref 11.5–14.5)
EST. GFR  (AFRICAN AMERICAN): >60 ML/MIN/1.73 M^2
EST. GFR  (NON AFRICAN AMERICAN): 56 ML/MIN/1.73 M^2
GLUCOSE SERPL-MCNC: 98 MG/DL (ref 70–110)
HCT VFR BLD AUTO: 39.9 % (ref 37–48.5)
HGB BLD-MCNC: 13.1 G/DL (ref 12–16)
IMM GRANULOCYTES # BLD AUTO: 0.01 K/UL (ref 0–0.04)
IMM GRANULOCYTES NFR BLD AUTO: 0.2 % (ref 0–0.5)
LYMPHOCYTES # BLD AUTO: 2.1 K/UL (ref 1–4.8)
LYMPHOCYTES NFR BLD: 43.1 % (ref 18–48)
MCH RBC QN AUTO: 28.2 PG (ref 27–31)
MCHC RBC AUTO-ENTMCNC: 32.8 G/DL (ref 32–36)
MCV RBC AUTO: 86 FL (ref 82–98)
MONOCYTES # BLD AUTO: 0.5 K/UL (ref 0.3–1)
MONOCYTES NFR BLD: 9.8 % (ref 4–15)
NEUTROPHILS # BLD AUTO: 2 K/UL (ref 1.8–7.7)
NEUTROPHILS NFR BLD: 41.2 % (ref 38–73)
NRBC BLD-RTO: 0 /100 WBC
PLATELET # BLD AUTO: 175 K/UL (ref 150–350)
PMV BLD AUTO: 12.5 FL (ref 9.2–12.9)
POTASSIUM SERPL-SCNC: 4.2 MMOL/L (ref 3.5–5.1)
PROT SERPL-MCNC: 7.4 G/DL (ref 6–8.4)
RBC # BLD AUTO: 4.64 M/UL (ref 4–5.4)
SODIUM SERPL-SCNC: 142 MMOL/L (ref 136–145)
TROPONIN I SERPL DL<=0.01 NG/ML-MCNC: 0.01 NG/ML (ref 0–0.03)
TROPONIN I SERPL DL<=0.01 NG/ML-MCNC: <0.006 NG/ML (ref 0–0.03)
WBC # BLD AUTO: 4.9 K/UL (ref 3.9–12.7)

## 2020-12-17 PROCEDURE — 84484 ASSAY OF TROPONIN QUANT: CPT | Mod: 91,ER

## 2020-12-17 PROCEDURE — 85025 COMPLETE CBC W/AUTO DIFF WBC: CPT | Mod: ER

## 2020-12-17 PROCEDURE — 99284 EMERGENCY DEPT VISIT MOD MDM: CPT | Mod: 25,ER

## 2020-12-17 PROCEDURE — 25000003 PHARM REV CODE 250: Mod: ER | Performed by: EMERGENCY MEDICINE

## 2020-12-17 PROCEDURE — 80053 COMPREHEN METABOLIC PANEL: CPT | Mod: ER

## 2020-12-17 PROCEDURE — 84484 ASSAY OF TROPONIN QUANT: CPT | Mod: ER

## 2020-12-17 RX ORDER — ASPIRIN 325 MG
325 TABLET ORAL
Status: COMPLETED | OUTPATIENT
Start: 2020-12-17 | End: 2020-12-17

## 2020-12-17 RX ORDER — OMEPRAZOLE 20 MG/1
20 CAPSULE, DELAYED RELEASE ORAL DAILY
Qty: 30 CAPSULE | Refills: 1 | Status: SHIPPED | OUTPATIENT
Start: 2020-12-17 | End: 2021-12-17

## 2020-12-17 RX ADMIN — ASPIRIN 325 MG: 325 TABLET ORAL at 05:12

## 2020-12-17 NOTE — ED NOTES
Pt on cardiac, pulse ox, and BP monitoring, VSS, Resp e/u. Stretcher locked in lowest position. Daughter at bedside. Side rails up x2. Call bell within reach. Pt given warm blanket and repositioned. No new orders at this time.

## 2020-12-17 NOTE — ED NOTES
"Pt arrives with daughter at bedside c/o chest pain (central, radiating to left arm "pressure" pain intermittent since yesterday. 10/10 intensity) Pt reports took isosorbide this morning to ease pain. Pt reports at time of worst pain, also felt weak and hot.   "

## 2020-12-17 NOTE — ED PROVIDER NOTES
"Encounter Date: 12/17/2020       History     Chief Complaint   Patient presents with    Chest Pain     Central, radiating to left arm "pressure" pain intermittent since yesterday. 10/10 pain.Also felt weak and hot. Has cardiac history     Patient is a 75-year-old female who presents today with complaints of intermittent left-sided chest pain since 3:00 a.m. this morning (2 hours PTA).  Pain described as a pressure that radiated to left arm.  Associated symptoms included diaphoresis.  Denies any shortness breath, nausea vomiting.  Denies any leg pain or leg swelling. Pain has resolved by the time she presented to the ED. Patient took isosorbide PTA.         Review of patient's allergies indicates:   Allergen Reactions    Acidophilus-pectin, citrus Itching    Codeine Itching     Past Medical History:   Diagnosis Date    Allergic rhinitis     Arthritis     Depression     DM II (diabetes mellitus, type II), controlled     GERD (gastroesophageal reflux disease)     HLD (hyperlipidemia)     Hyperlipidemia     Hypertension     Lumbago     Vitamin D deficiency      Past Surgical History:   Procedure Laterality Date    APPENDECTOMY      BREAST BIOPSY      CARPAL TUNNEL RELEASE      COLONOSCOPY N/A 12/4/2019    Procedure: COLONOSCOPY;  Surgeon: Lyubov Root MD;  Location: [facility];  Service: Endoscopy;  Laterality: N/A;    HYSTERECTOMY      SHOULDER SURGERY       Family History   Problem Relation Age of Onset    Colon cancer Neg Hx      Social History     Tobacco Use    Smoking status: Never Smoker    Smokeless tobacco: Never Used   Substance Use Topics    Alcohol use: No    Drug use: No     Review of Systems   Constitutional: Negative for chills, diaphoresis and fever.   HENT: Negative for congestion, rhinorrhea and sore throat.    Eyes: Negative for pain, redness and visual disturbance.   Respiratory: Negative for cough and shortness of breath.    Cardiovascular: Positive for chest pain. " Negative for palpitations and leg swelling.   Gastrointestinal: Negative for abdominal distention, abdominal pain, blood in stool, constipation, diarrhea, nausea and vomiting.   Genitourinary: Negative for dysuria and hematuria.   Musculoskeletal: Negative for arthralgias and joint swelling.   Skin: Negative for rash and wound.   Neurological: Negative for seizures, syncope and headaches.   All other systems reviewed and are negative.      Physical Exam     Initial Vitals [12/17/20 0457]   BP Pulse Resp Temp SpO2   102/66 76 18 97.8 °F (36.6 °C) 100 %      MAP       --         Physical Exam    Nursing note and vitals reviewed.  Constitutional: She appears well-developed and well-nourished. No distress.   HENT:   Head: Normocephalic and atraumatic.   Mouth/Throat: Oropharynx is clear and moist.   Eyes: Conjunctivae and EOM are normal. Pupils are equal, round, and reactive to light.   Neck: Neck supple. No tracheal deviation present.   Cardiovascular: Normal rate, regular rhythm, normal heart sounds and intact distal pulses.   Pulmonary/Chest: Breath sounds normal. No respiratory distress.   Abdominal: Soft. She exhibits no distension. There is no abdominal tenderness. There is no rebound and no guarding.   Musculoskeletal: Normal range of motion. No tenderness or edema.      Comments: No unilateral leg width discrepancy   Neurological: She is alert and oriented to person, place, and time. GCS score is 15. GCS eye subscore is 4. GCS verbal subscore is 5. GCS motor subscore is 6.   No focal deficits   Skin: Skin is warm. No rash noted. No erythema.   Psychiatric: She has a normal mood and affect. Her behavior is normal.         ED Course   Procedures  Labs Reviewed   COMPREHENSIVE METABOLIC PANEL - Abnormal; Notable for the following components:       Result Value    eGFR if non  56.0 (*)     All other components within normal limits   CBC W/ AUTO DIFFERENTIAL   TROPONIN I   TROPONIN I     Results for  orders placed or performed during the hospital encounter of 12/17/20   CBC auto differential   Result Value Ref Range    WBC 4.90 3.90 - 12.70 K/uL    RBC 4.64 4.00 - 5.40 M/uL    Hemoglobin 13.1 12.0 - 16.0 g/dL    Hematocrit 39.9 37.0 - 48.5 %    MCV 86 82 - 98 fL    MCH 28.2 27.0 - 31.0 pg    MCHC 32.8 32.0 - 36.0 g/dL    RDW 13.7 11.5 - 14.5 %    Platelets 175 150 - 350 K/uL    MPV 12.5 9.2 - 12.9 fL    Immature Granulocytes 0.2 0.0 - 0.5 %    Gran # (ANC) 2.0 1.8 - 7.7 K/uL    Immature Grans (Abs) 0.01 0.00 - 0.04 K/uL    Lymph # 2.1 1.0 - 4.8 K/uL    Mono # 0.5 0.3 - 1.0 K/uL    Eos # 0.2 0.0 - 0.5 K/uL    Baso # 0.04 0.00 - 0.20 K/uL    nRBC 0 0 /100 WBC    Gran % 41.2 38.0 - 73.0 %    Lymph % 43.1 18.0 - 48.0 %    Mono % 9.8 4.0 - 15.0 %    Eosinophil % 4.9 0.0 - 8.0 %    Basophil % 0.8 0.0 - 1.9 %    Differential Method Automated    Comprehensive metabolic panel   Result Value Ref Range    Sodium 142 136 - 145 mmol/L    Potassium 4.2 3.5 - 5.1 mmol/L    Chloride 104 95 - 110 mmol/L    CO2 27 23 - 29 mmol/L    Glucose 98 70 - 110 mg/dL    BUN 15 8 - 23 mg/dL    Creatinine 1.0 0.5 - 1.4 mg/dL    Calcium 9.6 8.7 - 10.5 mg/dL    Total Protein 7.4 6.0 - 8.4 g/dL    Albumin 3.9 3.5 - 5.2 g/dL    Total Bilirubin 0.5 0.1 - 1.0 mg/dL    Alkaline Phosphatase 131 55 - 135 U/L    AST 29 10 - 40 U/L    ALT 23 10 - 44 U/L    Anion Gap 11 8 - 16 mmol/L    eGFR if African American >60.0 >60 mL/min/1.73 m^2    eGFR if non  56.0 (A) >60 mL/min/1.73 m^2   Troponin I   Result Value Ref Range    Troponin I 0.008 0.000 - 0.026 ng/mL   Troponin I   Result Value Ref Range    Troponin I <0.006 0.000 - 0.026 ng/mL            Imaging Results          X-Ray Chest AP Portable (Final result)  Result time 12/17/20 07:27:18    Final result by Aguilar Ellison MD (12/17/20 07:27:18)                 Impression:      No acute process seen.      Electronically signed by: Aguilar Ellison MD  Date:    12/17/2020  Time:    07:27              Narrative:    EXAMINATION:  XR CHEST AP PORTABLE    CLINICAL HISTORY:  chest pain;    FINDINGS:  Single view of the chest.  Comparison 01/31/2013 comparison    Cardiac silhouette is normal.  Aorta demonstrates atherosclerotic disease.  The lungs demonstrate no evidence of active disease.  No evidence of pleural effusion or pneumothorax.  Bones bones demonstrate mild degenerative change.                              EKG reviewed interpreted by ED provider as normal sinus rhythm with rate of 65, left axis deviation, normal intervals; no ST depression or ST elevation    Medications   aspirin tablet 325 mg (325 mg Oral Given 12/17/20 0550)         6:00 AM: Care handed off to Dr. White            9:39 AM: Reassessed pt at this time.  Pt states her condition has improved at this time. Discussed with pt all pertinent ED information and results. Discussed pt dx of chest pain and plan of tx. Gave pt all f/u and return to the ED instructions. All questions and concerns were addressed at this time. Pt expresses understanding of information and instructions, and is comfortable with plan to discharge. Pt is stable for discharge.                            Clinical Impression:     ICD-10-CM ICD-9-CM   1. Chest pain, unspecified type  R07.9 786.50                      Disposition:   Disposition: Discharged  Condition: Stable     ED Disposition Condition    Discharge Stable        ED Prescriptions     Medication Sig Dispense Start Date End Date Auth. Provider    omeprazole (PRILOSEC) 20 MG capsule Take 1 capsule (20 mg total) by mouth once daily. 30 capsule 12/17/2020 12/17/2021 Herbert White MD        Follow-up Information     Follow up With Specialties Details Why Contact Info    Carlton Calderon MD Family Medicine   [id number] [facility]  [facility] [id number]  [phone]                                         Herbert White MD  12/17/20 6900

## 2020-12-17 NOTE — ED NOTES
Pt in bed resting, monitor in place, no c/o of pain, sr up x 2, family member in room, pt understands she has blood work due at 9am. Will continue to monitor.

## 2020-12-17 NOTE — ED NOTES
Per request of pt and daughter, ANTWON De, while reviewing home medications threw away empty bottles, unneeded papers, excess bags, and consolidated medications per allowance and permission of pt and daughter.

## 2021-11-17 ENCOUNTER — TELEPHONE (OUTPATIENT)
Dept: RADIOLOGY | Facility: HOSPITAL | Age: 76
End: 2021-11-17
Payer: COMMERCIAL

## 2022-04-05 ENCOUNTER — HOSPITAL ENCOUNTER (OUTPATIENT)
Facility: HOSPITAL | Age: 77
Discharge: HOME | End: 2022-04-06
Attending: EMERGENCY MEDICINE | Admitting: INTERNAL MEDICINE
Payer: COMMERCIAL

## 2022-04-05 DIAGNOSIS — R07.9 CHEST PAIN: ICD-10-CM

## 2022-04-05 PROBLEM — E11.40 TYPE 2 DIABETES, CONTROLLED, WITH NEUROPATHY: Status: ACTIVE | Noted: 2022-04-05

## 2022-04-05 PROBLEM — I25.10 CAD (CORONARY ARTERY DISEASE): Status: ACTIVE | Noted: 2022-04-05

## 2022-04-05 PROBLEM — E11.9 TYPE 2 DIABETES MELLITUS: Status: ACTIVE | Noted: 2022-04-05

## 2022-04-05 LAB
ALBUMIN SERPL BCP-MCNC: 3.9 G/DL (ref 3.5–5.2)
ALP SERPL-CCNC: 136 U/L (ref 55–135)
ALT SERPL W/O P-5'-P-CCNC: 17 U/L (ref 10–44)
ANION GAP SERPL CALC-SCNC: 12 MMOL/L (ref 8–16)
AST SERPL-CCNC: 24 U/L (ref 10–40)
BASOPHILS # BLD AUTO: 0.04 K/UL (ref 0–0.2)
BASOPHILS NFR BLD: 0.8 % (ref 0–1.9)
BILIRUB SERPL-MCNC: 0.5 MG/DL (ref 0.1–1)
BNP SERPL-MCNC: 112 PG/ML (ref 0–99)
BUN SERPL-MCNC: 15 MG/DL (ref 8–23)
CALCIUM SERPL-MCNC: 9.6 MG/DL (ref 8.7–10.5)
CHLORIDE SERPL-SCNC: 104 MMOL/L (ref 95–110)
CO2 SERPL-SCNC: 26 MMOL/L (ref 23–29)
CREAT SERPL-MCNC: 0.9 MG/DL (ref 0.5–1.4)
DIFFERENTIAL METHOD: NORMAL
EOSINOPHIL # BLD AUTO: 0.2 K/UL (ref 0–0.5)
EOSINOPHIL NFR BLD: 4 % (ref 0–8)
ERYTHROCYTE [DISTWIDTH] IN BLOOD BY AUTOMATED COUNT: 13.7 % (ref 11.5–14.5)
EST. GFR  (AFRICAN AMERICAN): >60 ML/MIN/1.73 M^2
EST. GFR  (NON AFRICAN AMERICAN): >60 ML/MIN/1.73 M^2
GLUCOSE SERPL-MCNC: 98 MG/DL (ref 70–110)
HCT VFR BLD AUTO: 40.9 % (ref 37–48.5)
HGB BLD-MCNC: 13.6 G/DL (ref 12–16)
IMM GRANULOCYTES # BLD AUTO: 0 K/UL (ref 0–0.04)
IMM GRANULOCYTES NFR BLD AUTO: 0 % (ref 0–0.5)
INR PPP: 1.1 (ref 0.8–1.2)
LYMPHOCYTES # BLD AUTO: 1.9 K/UL (ref 1–4.8)
LYMPHOCYTES NFR BLD: 37.4 % (ref 18–48)
MCH RBC QN AUTO: 28 PG (ref 27–31)
MCHC RBC AUTO-ENTMCNC: 33.3 G/DL (ref 32–36)
MCV RBC AUTO: 84 FL (ref 82–98)
MONOCYTES # BLD AUTO: 0.5 K/UL (ref 0.3–1)
MONOCYTES NFR BLD: 9.7 % (ref 4–15)
NEUTROPHILS # BLD AUTO: 2.4 K/UL (ref 1.8–7.7)
NEUTROPHILS NFR BLD: 48.1 % (ref 38–73)
NRBC BLD-RTO: 0 /100 WBC
PLATELET # BLD AUTO: 188 K/UL (ref 150–450)
PMV BLD AUTO: 12.5 FL (ref 9.2–12.9)
POCT GLUCOSE: 91 MG/DL (ref 70–110)
POTASSIUM SERPL-SCNC: 4.5 MMOL/L (ref 3.5–5.1)
PROT SERPL-MCNC: 7.4 G/DL (ref 6–8.4)
PROTHROMBIN TIME: 11.2 SEC (ref 9–12.5)
RBC # BLD AUTO: 4.86 M/UL (ref 4–5.4)
SODIUM SERPL-SCNC: 142 MMOL/L (ref 136–145)
TROPONIN I SERPL DL<=0.01 NG/ML-MCNC: 0.01 NG/ML (ref 0–0.03)
TROPONIN I SERPL DL<=0.01 NG/ML-MCNC: <0.006 NG/ML (ref 0–0.03)
WBC # BLD AUTO: 5.05 K/UL (ref 3.9–12.7)

## 2022-04-05 PROCEDURE — 93010 ELECTROCARDIOGRAM REPORT: CPT | Mod: ,,, | Performed by: INTERNAL MEDICINE

## 2022-04-05 PROCEDURE — 84484 ASSAY OF TROPONIN QUANT: CPT | Mod: ER | Performed by: EMERGENCY MEDICINE

## 2022-04-05 PROCEDURE — 83036 HEMOGLOBIN GLYCOSYLATED A1C: CPT | Performed by: NURSE PRACTITIONER

## 2022-04-05 PROCEDURE — 93005 ELECTROCARDIOGRAM TRACING: CPT | Mod: ER

## 2022-04-05 PROCEDURE — 36415 COLL VENOUS BLD VENIPUNCTURE: CPT | Performed by: EMERGENCY MEDICINE

## 2022-04-05 PROCEDURE — G0378 HOSPITAL OBSERVATION PER HR: HCPCS | Mod: ER

## 2022-04-05 PROCEDURE — G0378 HOSPITAL OBSERVATION PER HR: HCPCS

## 2022-04-05 PROCEDURE — 93010 EKG 12-LEAD: ICD-10-PCS | Mod: ,,, | Performed by: INTERNAL MEDICINE

## 2022-04-05 PROCEDURE — 36415 COLL VENOUS BLD VENIPUNCTURE: CPT | Performed by: NURSE PRACTITIONER

## 2022-04-05 PROCEDURE — 85025 COMPLETE CBC W/AUTO DIFF WBC: CPT | Mod: ER | Performed by: EMERGENCY MEDICINE

## 2022-04-05 PROCEDURE — 80061 LIPID PANEL: CPT | Performed by: NURSE PRACTITIONER

## 2022-04-05 PROCEDURE — 25000003 PHARM REV CODE 250: Performed by: NURSE PRACTITIONER

## 2022-04-05 PROCEDURE — 99285 EMERGENCY DEPT VISIT HI MDM: CPT | Mod: 25,ER

## 2022-04-05 PROCEDURE — 83880 ASSAY OF NATRIURETIC PEPTIDE: CPT | Mod: ER | Performed by: EMERGENCY MEDICINE

## 2022-04-05 PROCEDURE — 63600175 PHARM REV CODE 636 W HCPCS: Performed by: NURSE PRACTITIONER

## 2022-04-05 PROCEDURE — 85610 PROTHROMBIN TIME: CPT | Mod: ER | Performed by: EMERGENCY MEDICINE

## 2022-04-05 PROCEDURE — 96372 THER/PROPH/DIAG INJ SC/IM: CPT | Performed by: NURSE PRACTITIONER

## 2022-04-05 PROCEDURE — 84484 ASSAY OF TROPONIN QUANT: CPT | Mod: 91 | Performed by: NURSE PRACTITIONER

## 2022-04-05 PROCEDURE — 25000003 PHARM REV CODE 250: Performed by: EMERGENCY MEDICINE

## 2022-04-05 PROCEDURE — 84484 ASSAY OF TROPONIN QUANT: CPT | Mod: 91 | Performed by: EMERGENCY MEDICINE

## 2022-04-05 PROCEDURE — 80053 COMPREHEN METABOLIC PANEL: CPT | Mod: ER | Performed by: EMERGENCY MEDICINE

## 2022-04-05 RX ORDER — IBUPROFEN 200 MG
24 TABLET ORAL
Status: DISCONTINUED | OUTPATIENT
Start: 2022-04-05 | End: 2022-04-06 | Stop reason: HOSPADM

## 2022-04-05 RX ORDER — AMOXICILLIN 250 MG
1 CAPSULE ORAL DAILY PRN
Status: DISCONTINUED | OUTPATIENT
Start: 2022-04-05 | End: 2022-04-06 | Stop reason: HOSPADM

## 2022-04-05 RX ORDER — ACETAMINOPHEN 325 MG/1
650 TABLET ORAL EVERY 4 HOURS PRN
Status: DISCONTINUED | OUTPATIENT
Start: 2022-04-05 | End: 2022-04-06 | Stop reason: HOSPADM

## 2022-04-05 RX ORDER — NAPROXEN SODIUM 220 MG/1
81 TABLET, FILM COATED ORAL DAILY
Status: DISCONTINUED | OUTPATIENT
Start: 2022-04-06 | End: 2022-04-05

## 2022-04-05 RX ORDER — AMLODIPINE BESYLATE 2.5 MG/1
2.5 TABLET ORAL 2 TIMES DAILY
Status: DISCONTINUED | OUTPATIENT
Start: 2022-04-05 | End: 2022-04-06 | Stop reason: HOSPADM

## 2022-04-05 RX ORDER — PANTOPRAZOLE SODIUM 40 MG/1
40 TABLET, DELAYED RELEASE ORAL DAILY
Refills: 1 | Status: DISCONTINUED | OUTPATIENT
Start: 2022-04-06 | End: 2022-04-06 | Stop reason: HOSPADM

## 2022-04-05 RX ORDER — NAPROXEN SODIUM 220 MG/1
325 TABLET, FILM COATED ORAL ONCE
Status: COMPLETED | OUTPATIENT
Start: 2022-04-05 | End: 2022-04-05

## 2022-04-05 RX ORDER — VALSARTAN 160 MG/1
320 TABLET ORAL DAILY
Status: DISCONTINUED | OUTPATIENT
Start: 2022-04-06 | End: 2022-04-06 | Stop reason: HOSPADM

## 2022-04-05 RX ORDER — TALC
6 POWDER (GRAM) TOPICAL NIGHTLY PRN
Status: DISCONTINUED | OUTPATIENT
Start: 2022-04-05 | End: 2022-04-06 | Stop reason: HOSPADM

## 2022-04-05 RX ORDER — NALOXONE HCL 0.4 MG/ML
0.02 VIAL (ML) INJECTION
Status: DISCONTINUED | OUTPATIENT
Start: 2022-04-05 | End: 2022-04-06 | Stop reason: HOSPADM

## 2022-04-05 RX ORDER — SIMETHICONE 80 MG
1 TABLET,CHEWABLE ORAL 4 TIMES DAILY PRN
Status: DISCONTINUED | OUTPATIENT
Start: 2022-04-05 | End: 2022-04-06 | Stop reason: HOSPADM

## 2022-04-05 RX ORDER — ONDANSETRON 2 MG/ML
4 INJECTION INTRAMUSCULAR; INTRAVENOUS EVERY 8 HOURS PRN
Status: DISCONTINUED | OUTPATIENT
Start: 2022-04-05 | End: 2022-04-06 | Stop reason: HOSPADM

## 2022-04-05 RX ORDER — LEVOCETIRIZINE DIHYDROCHLORIDE 5 MG/1
5 TABLET, FILM COATED ORAL DAILY
Status: DISCONTINUED | OUTPATIENT
Start: 2022-04-06 | End: 2022-04-05

## 2022-04-05 RX ORDER — MONTELUKAST SODIUM 10 MG/1
10 TABLET ORAL DAILY
Status: DISCONTINUED | OUTPATIENT
Start: 2022-04-06 | End: 2022-04-06 | Stop reason: HOSPADM

## 2022-04-05 RX ORDER — GLUCAGON 1 MG
1 KIT INJECTION
Status: DISCONTINUED | OUTPATIENT
Start: 2022-04-05 | End: 2022-04-06 | Stop reason: HOSPADM

## 2022-04-05 RX ORDER — ISOSORBIDE MONONITRATE 30 MG/1
30 TABLET, EXTENDED RELEASE ORAL DAILY
Status: DISCONTINUED | OUTPATIENT
Start: 2022-04-06 | End: 2022-04-06

## 2022-04-05 RX ORDER — DIPHENHYDRAMINE HCL 25 MG
25 CAPSULE ORAL EVERY 6 HOURS PRN
Status: DISCONTINUED | OUTPATIENT
Start: 2022-04-05 | End: 2022-04-06 | Stop reason: HOSPADM

## 2022-04-05 RX ORDER — OLANZAPINE 7.5 MG/1
7.5 TABLET ORAL NIGHTLY
COMMUNITY
End: 2022-04-05 | Stop reason: CLARIF

## 2022-04-05 RX ORDER — CETIRIZINE HYDROCHLORIDE 5 MG/1
5 TABLET ORAL DAILY
Status: DISCONTINUED | OUTPATIENT
Start: 2022-04-06 | End: 2022-04-06 | Stop reason: HOSPADM

## 2022-04-05 RX ORDER — GABAPENTIN 100 MG/1
100 CAPSULE ORAL NIGHTLY
Status: DISCONTINUED | OUTPATIENT
Start: 2022-04-05 | End: 2022-04-06 | Stop reason: HOSPADM

## 2022-04-05 RX ORDER — INSULIN ASPART 100 [IU]/ML
0-5 INJECTION, SOLUTION INTRAVENOUS; SUBCUTANEOUS
Status: DISCONTINUED | OUTPATIENT
Start: 2022-04-05 | End: 2022-04-06 | Stop reason: HOSPADM

## 2022-04-05 RX ORDER — IBUPROFEN 200 MG
16 TABLET ORAL
Status: DISCONTINUED | OUTPATIENT
Start: 2022-04-05 | End: 2022-04-06 | Stop reason: HOSPADM

## 2022-04-05 RX ORDER — NAPROXEN SODIUM 220 MG/1
81 TABLET, FILM COATED ORAL DAILY
Status: DISCONTINUED | OUTPATIENT
Start: 2022-04-06 | End: 2022-04-06 | Stop reason: HOSPADM

## 2022-04-05 RX ORDER — SODIUM CHLORIDE 0.9 % (FLUSH) 0.9 %
10 SYRINGE (ML) INJECTION EVERY 12 HOURS PRN
Status: DISCONTINUED | OUTPATIENT
Start: 2022-04-05 | End: 2022-04-06 | Stop reason: HOSPADM

## 2022-04-05 RX ORDER — ENOXAPARIN SODIUM 100 MG/ML
40 INJECTION SUBCUTANEOUS EVERY 24 HOURS
Status: DISCONTINUED | OUTPATIENT
Start: 2022-04-05 | End: 2022-04-06 | Stop reason: HOSPADM

## 2022-04-05 RX ORDER — ATORVASTATIN CALCIUM 10 MG/1
20 TABLET, FILM COATED ORAL NIGHTLY
Status: DISCONTINUED | OUTPATIENT
Start: 2022-04-05 | End: 2022-04-06 | Stop reason: HOSPADM

## 2022-04-05 RX ORDER — PAROXETINE 10 MG/1
20 TABLET, FILM COATED ORAL EVERY MORNING
Status: DISCONTINUED | OUTPATIENT
Start: 2022-04-06 | End: 2022-04-06 | Stop reason: HOSPADM

## 2022-04-05 RX ORDER — VERAPAMIL HYDROCHLORIDE 120 MG/1
240 TABLET, FILM COATED, EXTENDED RELEASE ORAL DAILY
Refills: 1 | Status: DISCONTINUED | OUTPATIENT
Start: 2022-04-06 | End: 2022-04-06 | Stop reason: HOSPADM

## 2022-04-05 RX ADMIN — ENOXAPARIN SODIUM 40 MG: 100 INJECTION SUBCUTANEOUS at 05:04

## 2022-04-05 RX ADMIN — GABAPENTIN 100 MG: 100 CAPSULE ORAL at 08:04

## 2022-04-05 RX ADMIN — AMLODIPINE BESYLATE 2.5 MG: 2.5 TABLET ORAL at 08:04

## 2022-04-05 RX ADMIN — ATORVASTATIN CALCIUM 20 MG: 10 TABLET, FILM COATED ORAL at 08:04

## 2022-04-05 RX ADMIN — ASPIRIN 81 MG CHEWABLE TABLET 324 MG: 81 TABLET CHEWABLE at 06:04

## 2022-04-05 RX ADMIN — DIPHENHYDRAMINE HYDROCHLORIDE 25 MG: 25 CAPSULE ORAL at 11:04

## 2022-04-05 NOTE — ED NOTES
Pt resting with eyes closed, awakens easily to verbal stimuli, sinus henny on cardiac monitor, resp e/u, skin warm and dry, nad. Pt is aware of poc,and she denies any needs at this time. Bed locked in lowest position, side rails up X2, call bell in reach, family member at the bedside. Will continue to monitor

## 2022-04-05 NOTE — Clinical Note
Is this patient a high probability for COVID-19?: No   Diagnosis: Chest pain [949252]   Future Attending Provider: EDIS SALDANA [[id number]]   Admitting Provider:: EDIS SALDANA [[id number]]

## 2022-04-05 NOTE — PLAN OF CARE
[facility] - Med Surg 3  Discharge Assessment    Primary Care Provider: Carlton Calderon MD     Discharge Assessment (most recent)     BRIEF DISCHARGE ASSESSMENT - 04/05/22 1621        Discharge Planning    Assessment Type Discharge Planning Brief Assessment     Resource/Environmental Concerns none     Support Systems None     Equipment Currently Used at Home cane, straight     Current Living Arrangements home/apartment/condo     Patient/Family Anticipates Transition to home     Patient/Family Anticipated Services at Transition none     DME Needed Upon Discharge  none     Discharge Plan A Home     Discharge Plan B Home               CM met with patient at the bedside to assess for discharge needs.  Patient states that she lives alone and does not have much support.  She does not drive and either walks or uses transportation services.  She stated that she has home health but does not recall the name of the company.  Patient will need new home health orders if she converts to inpatient.  Patient's daughter will pick her up at discharge.  Patient has no other discharge needs at this time.  CM provided a transitional care folder, information on advanced directives, information on pharmacy bedside delivery, and discharge planning begins on admission with contact information for any needs/questions.

## 2022-04-05 NOTE — H&P
"[facility]  [facility]  History & Physical    Patient Name: Odalis Rosas  MRN: [id number]  Patient Class: OP- Observation  Admission Date: 4/5/2022  Attending Physician: Mara Chaparro MD   Primary Care Provider: Carlton Calderon MD         Patient information was obtained from patient and ER records.     Subjective:     Principal Problem:Chest pain    Chief Complaint:   Chief Complaint   Patient presents with    Chest Pain     L sided since "earlier this am"        HPI: Odalis Rosas is a 76-year-old  female with a PMHx of Hypertension, CAD, Hyperlipidemia and DM II who presented to the ED with c/o substernal chest pain that started a few hours PTA. Pain is moderate in intensity, radiating to her L ear which she describes as feeling "like toothpicks in your ear", and nonexertional. States the pain was 10/10 at the time of onset. Associated symptoms include feeling hot and nauseated. Denies any SOB/GONSALES, V/D, neck or jaw pain, upper extremity pain, numbness/tingling, palpitations, cough, ABD pain, back pain, HA, lightheadedness, dizziness, syncope, weakness, fever or chills. She took 81 mg ASA at home which resolved her chest pain. At present her chest pain is 5/10 and the radiating L ear pain has resolved. Initial work-up showed: Troponin 0.010, , unremarkable CXR, and EKG showed sinus bradycardia with sinus arrhythmia, possible left atrial enlargement, left anterior fascicular block, LVH, Cannot rule out Septal infarct. Case was discussed with Cardiology (Nikolas) per the ED. Hospital Medicine was contacted for admission and patient placed in Observation. Full code status and SDM is her daughter, Indigo Deal, at [phone].       Past Medical History:   Diagnosis Date    Allergic rhinitis     Arthritis     Depression     DM II (diabetes mellitus, type II), controlled     GERD (gastroesophageal reflux disease)     HLD (hyperlipidemia)     Hyperlipidemia     " Hypertension     Lumbago     Vitamin D deficiency        Past Surgical History:   Procedure Laterality Date    APPENDECTOMY      BREAST BIOPSY      CARPAL TUNNEL RELEASE      COLONOSCOPY N/A 12/04/2019    Procedure: COLONOSCOPY;  Surgeon: Lyubov Root MD;  Location: [facility];  Service: Endoscopy;  Laterality: N/A;    CORONARY ANGIOPLASTY WITH STENT PLACEMENT      HYSTERECTOMY      SHOULDER SURGERY         Review of patient's allergies indicates:   Allergen Reactions    Acidophilus-pectin, citrus Itching    Codeine Itching       No current facility-administered medications on file prior to encounter.     Current Outpatient Medications on File Prior to Encounter   Medication Sig    aspirin 81 MG Chew Take 81 mg by mouth once daily.    atorvastatin (LIPITOR) 20 MG tablet TAKE ONE TABLET BY MOUTH EVERY EVENING    gabapentin (NEURONTIN) 100 MG capsule Take 1 capsule (100 mg total) by mouth every evening.    isosorbide mononitrate (IMDUR) 30 MG 24 hr tablet TAKE ONE TABLET BY MOUTH ONCE DAILY    latanoprost 0.005 % ophthalmic solution 1 drop every evening.    levocetirizine (XYZAL) 5 MG tablet TAKE ONE TABLET BY MOUTH ONCE DAILY    meloxicam (MOBIC) 7.5 MG tablet Take 1 tablet (7.5 mg total) by mouth 2 (two) times daily as needed for Pain.    montelukast (SINGULAIR) 10 mg tablet TAKE ONE TABLET BY MOUTH ONCE DAILY AS NEEDED FOR ALLERGIES    paroxetine (PAXIL) 20 MG tablet TAKE ONE TABLET BY MOUTH EVERY MORNING    valsartan (DIOVAN) 160 MG tablet Take 320 mg by mouth once daily.    verapamiL (VERELAN) 240 MG C24P TAKE 1 CAPSULE BY MOUTH EVERY EVENING    aluminum-magnesium hydroxide-simethicone (MAALOX) 200-200-20 mg/5 mL Susp Take 30 mLs by mouth 4 (four) times daily before meals and nightly.    amLODIPine (NORVASC) 2.5 MG tablet     CLEARLAX 17 gram/dose powder MIX 1 CAPFULL IN 4 OUNCE OF LIQUID ONCE DAILY OR EVERY OTHER DAY AS NEEDED FOR CONSTIPATION    desonide (DESOWEN) 0.05 % lotion  Apply topically 2 (two) times daily as needed (rash).    fluticasone propionate (FLONASE) 50 mcg/actuation nasal spray USE TWO SPRAYS INTO EACH NOSTRIL ONCE DAILY AS NEEDED FOR RHINITIS    mupirocin (BACTROBAN) 2 % ointment Apply topically 3 (three) times daily as needed (open sores).    nitrofurantoin, macrocrystal-monohydrate, (MACROBID) 100 MG capsule Take 1 capsule (100 mg total) by mouth 2 (two) times daily.    omeprazole (PRILOSEC) 20 MG capsule Take 1 capsule (20 mg total) by mouth once daily.    [DISCONTINUED] OLANZapine (ZYPREXA) 7.5 MG tablet Take 7.5 mg by mouth every evening.     Family History    Reviewed and not pertinent        Tobacco Use    Smoking status: Former Smoker    Smokeless tobacco: Never Used   Substance and Sexual Activity    Alcohol use: No    Drug use: No    Sexual activity: Not on file     Review of Systems   Constitutional:  Negative for chills and fever.   HENT:  Negative for congestion, sinus pressure, sinus pain and trouble swallowing.    Eyes: Negative.    Respiratory:  Negative for cough, shortness of breath and wheezing.    Cardiovascular:  Positive for chest pain. Negative for palpitations and leg swelling.   Gastrointestinal:  Positive for nausea. Negative for abdominal pain, diarrhea and vomiting.   Genitourinary: Negative.    Musculoskeletal: Negative.    Skin: Negative.    Neurological:  Negative for dizziness, syncope, speech difficulty, weakness, numbness and headaches.   Hematological: Negative.    Psychiatric/Behavioral:  Negative for confusion and decreased concentration.    All other systems reviewed and are negative.  Objective:     Vital Signs (Most Recent):  Temp: 98.8 °F (37.1 °C) (04/05/22 1602)  Pulse: (!) 53 (04/05/22 1602)  Resp: 18 (04/05/22 1602)  BP: 121/73 (04/05/22 1602)  SpO2: 97 % (04/05/22 1602)   Vital Signs (24h Range):  Temp:  [98.1 °F (36.7 °C)-98.8 °F (37.1 °C)] 98.8 °F (37.1 °C)  Pulse:  [50-57] 53  Resp:  [14-20] 18  SpO2:  [96 %-99 %]  97 %  BP: (104-131)/(62-73) 121/73     Weight: 64.9 kg (143 lb)  Body mass index is 27.02 kg/m².    Physical Exam  Vitals and nursing note reviewed.   Constitutional:       General: She is not in acute distress.     Appearance: Normal appearance. She is not ill-appearing.   HENT:      Head: Normocephalic and atraumatic.      Left Ear: Ear canal and external ear normal.      Nose: Nose normal.      Mouth/Throat:      Mouth: Mucous membranes are moist.   Eyes:      Extraocular Movements: Extraocular movements intact.      Pupils: Pupils are equal, round, and reactive to light.   Cardiovascular:      Rate and Rhythm: Bradycardia present.   Pulmonary:      Effort: Pulmonary effort is normal.      Breath sounds: Normal breath sounds and air entry. No wheezing, rhonchi or rales.   Abdominal:      General: Bowel sounds are normal.      Palpations: Abdomen is soft.      Tenderness: There is no abdominal tenderness. There is no guarding or rebound.   Musculoskeletal:      Cervical back: Full passive range of motion without pain.      Right lower leg: No edema.      Left lower leg: No edema.   Skin:     General: Skin is warm.      Capillary Refill: Capillary refill takes less than 2 seconds.   Neurological:      Mental Status: She is alert and oriented to person, place, and time.      Cranial Nerves: Cranial nerves are intact.   Psychiatric:         Attention and Perception: Attention normal.         Mood and Affect: Mood normal.         Speech: Speech normal.         Behavior: Behavior normal. Behavior is cooperative.         CRANIAL NERVES     CN III, IV, VI   Pupils are equal, round, and reactive to light.     Significant Labs:   Results for orders placed or performed during the hospital encounter of 04/05/22   CBC auto differential   Result Value Ref Range    WBC 5.05 3.90 - 12.70 K/uL    RBC 4.86 4.00 - 5.40 M/uL    Hemoglobin 13.6 12.0 - 16.0 g/dL    Hematocrit 40.9 37.0 - 48.5 %    MCV 84 82 - 98 fL    MCH 28.0 27.0 -  31.0 pg    MCHC 33.3 32.0 - 36.0 g/dL    RDW 13.7 11.5 - 14.5 %    Platelets 188 150 - 450 K/uL    MPV 12.5 9.2 - 12.9 fL    Immature Granulocytes 0.0 0.0 - 0.5 %    Gran # (ANC) 2.4 1.8 - 7.7 K/uL    Immature Grans (Abs) 0.00 0.00 - 0.04 K/uL    Lymph # 1.9 1.0 - 4.8 K/uL    Mono # 0.5 0.3 - 1.0 K/uL    Eos # 0.2 0.0 - 0.5 K/uL    Baso # 0.04 0.00 - 0.20 K/uL    nRBC 0 0 /100 WBC    Gran % 48.1 38.0 - 73.0 %    Lymph % 37.4 18.0 - 48.0 %    Mono % 9.7 4.0 - 15.0 %    Eosinophil % 4.0 0.0 - 8.0 %    Basophil % 0.8 0.0 - 1.9 %    Differential Method Automated    Comprehensive metabolic panel   Result Value Ref Range    Sodium 142 136 - 145 mmol/L    Potassium 4.5 3.5 - 5.1 mmol/L    Chloride 104 95 - 110 mmol/L    CO2 26 23 - 29 mmol/L    Glucose 98 70 - 110 mg/dL    BUN 15 8 - 23 mg/dL    Creatinine 0.9 0.5 - 1.4 mg/dL    Calcium 9.6 8.7 - 10.5 mg/dL    Total Protein 7.4 6.0 - 8.4 g/dL    Albumin 3.9 3.5 - 5.2 g/dL    Total Bilirubin 0.5 0.1 - 1.0 mg/dL    Alkaline Phosphatase 136 (H) 55 - 135 U/L    AST 24 10 - 40 U/L    ALT 17 10 - 44 U/L    Anion Gap 12 8 - 16 mmol/L    eGFR if African American >60.0 >60 mL/min/1.73 m^2    eGFR if non African American >60.0 >60 mL/min/1.73 m^2   Brain natriuretic peptide   Result Value Ref Range     (H) 0 - 99 pg/mL   Troponin I   Result Value Ref Range    Troponin I 0.010 0.000 - 0.026 ng/mL   Protime-INR   Result Value Ref Range    Prothrombin Time 11.2 9.0 - 12.5 sec    INR 1.1 0.8 - 1.2   Echo   Result Value Ref Range    BSA 1.67 m2    TDI SEPTAL 0.05 m/s    LV LATERAL E/E' RATIO 6.10 m/s    LV SEPTAL E/E' RATIO 12.20 m/s    LA WIDTH 4.43 cm    IVC diameter 1.3 cm    Left Ventricular Outflow Tract Mean Velocity 0.4975145431 cm/s    Left Ventricular Outflow Tract Mean Gradient 3.81 mmHg    TV mean gradient 15 mmHg    AORTIC VALVE CUSP SEPERATION 0 cm    TDI LATERAL 0.10 m/s    LVIDd 3.59 3.5 - 6.0 cm    IVS 1.18 (A) 0.6 - 1.1 cm    Posterior Wall 1.12 (A) 0.6 - 1.1 cm     Ao root annulus 2.81 cm    LVIDs 2.15 2.1 - 4.0 cm    FS 40 28 - 44 %    STJ 3.21 cm    Ascending aorta 3.24 cm    LV mass 132.11 g    LA size 3.33 cm    TAPSE 2.10 cm    Left Ventricle Relative Wall Thickness 0.62 cm    AV mean gradient 6 mmHg    AV valve area 2.37 cm2    AV Velocity Ratio 0.72     AV index (prosthetic) 0.77     MV valve area p 1/2 method 3.20 cm2    E/A ratio 0.91     Mean e' 0.08 m/s    E wave deceleration time [id number] msec    IVRT 79.507667698644588 msec    LVOT diameter 1.98 cm    LVOT area 3.1 cm2    LVOT peak jack 1.16 m/s    LVOT peak VTI 23.70 cm    Ao peak jack 1.61 m/s    Ao VTI 30.8 cm    RVOT peak jack 1.02 m/s    RVOT peak VTI 23.9 cm    LVOT stroke volume 72.94 cm3    AV peak gradient 10 mmHg    PV mean gradient 2.85 mmHg    E/E' ratio 8.13 m/s    MV Peak E Jack 0.61 m/s    TR Max Jack 2.43 m/s    MV stenosis pressure 1/2 time [id number] ms    MV Peak A Jack 0.67 m/s    LV Systolic Volume 15.38 mL    LV Systolic Volume Index 9.4 mL/m2    LV Diastolic Volume 53.92 mL    LV Diastolic Volume Index 32.88 mL/m2    LV Mass Index 81 g/m2    Echo EF Estimated 71 %    RA Major Axis 3.43 cm    Left Atrium Minor Axis 3.50 cm    Triscuspid Valve Regurgitation Peak Gradient 24 mmHg   POCT glucose   Result Value Ref Range    POCT Glucose 91 70 - 110 mg/dL        Significant Imaging:   Imaging Results              X-Ray Chest PA And Lateral (Final result)  Result time 04/05/22 10:39:56      Final result by Aguilar Ellison MD (04/05/22 10:39:56)                   Impression:      No acute process seen.      Electronically signed by: Aguilar Ellison MD  Date:    04/05/2022  Time:    10:39               Narrative:    EXAMINATION:  XR CHEST PA AND LATERAL    CLINICAL HISTORY:  Chest Pain;    COMPARISON:  12/17/2020.    FINDINGS:  Cardiac silhouette is normal.  The lungs demonstrate no evidence of active disease.  No evidence of pleural effusion or pneumothorax.  Bones appear intact with  scattered degenerative change.   Aorta demonstrates atherosclerotic disease. Tortuosity of the descending aorta can be seen with chronic hypertension.                                       Assessment/Plan:     * Chest pain with EKG changes   - Last saw An (Cards) in February 2020 - states she has not f/u'd d/t COVID.  - Troponin 0.010. EKG showed sinus bradycardia with sinus arrhythmia. CP now 5/10, but stable.   - Tele monitor    - ASA, Imdur, Norvasc and statin.    - Trend serial cardiac enzymes and EKG.  - Check FLP.  - Will obtain 2D echo.  - Cardiology consult (Connor aware)    - NPO p MN    - Pt high risk for CAD but has not been previously diagnosed  - F/U An outpatient once discharged       Type 2 diabetes, controlled, with neuropathy  Hemoglobin A1C   Date Value Ref Range Status   10/22/2021 6.0 % Final   07/22/2021 6.3 % Final   03/26/2021 6.0 % Final     -Does not take oral antidiabetics or insulin. Controlled with diet.  - Accuchecks with low dose SSI.  - Diabetic diet.  - Check HbA1c.   -Continue gabapentin    Mild episode of recurrent major depressive disorder  -Continue paxil       Hyperlipidemia  -FLP   -Continue atorvastatin       Hypertension  -BP well-controlled  -BP checks q4hr   -Continue antihypertensives         VTE Risk Mitigation (From admission, onward)         Ordered     enoxaparin injection 40 mg  Daily         04/05/22 1621     IP VTE HIGH RISK PATIENT  Once         04/05/22 1621     Place sequential compression device  Until discontinued         04/05/22 1621                   Danielle Vargas NP  Department of Hospital Medicine   [facility]

## 2022-04-05 NOTE — ED PROVIDER NOTES
"   History     Chief Complaint   Patient presents with    Chest Pain     L sided since "earlier this am"       Review of patient's allergies indicates:   Allergen Reactions    Acidophilus-pectin, citrus Itching    Codeine Itching       History of Present Illness   HPI    4/5/2022, 10:12 AM  The history is provided by the patient and grand-daugther    Odails Rosas is a 76 y.o. female presenting to the ED for chest pain.   Patient had taken an 81 mg of Aspirin.  Patient reports that she had heaviness to the chest that started approximately 9:00 a.m..  The chest pressure was rated 10/10.  The pain radiated to the ear.  It was associated with nausea, feeling hot, and shortness of breath.  Chest pain lasted approximately 25 minutes.  Patient sees Dr. Nolan (Cardiology)   patient's cardiac risk include cardiac stent, hypertension, and hyperlipidemia the pain was rated as severe at onset.  Chest pain is currently rated as 0/10.  Unclear what makes it worse, what makes it better.  Patient denies any previous history of DVT or PE.  Patient denies any prolonged travel or calf pain.      Arrival mode:  Personal Vehicle    PCP: Carlton Calderon MD     Allergies:  Review of patient's allergies indicates:   Allergen Reactions    Acidophilus-pectin, citrus Itching    Codeine Itching       Past Medical History:  Past Medical History:   Diagnosis Date    Allergic rhinitis     Arthritis     Depression     DM II (diabetes mellitus, type II), controlled     GERD (gastroesophageal reflux disease)     HLD (hyperlipidemia)     Hyperlipidemia     Hypertension     Lumbago     Vitamin D deficiency        Past Surgical History:  Past Surgical History:   Procedure Laterality Date    APPENDECTOMY      BREAST BIOPSY      CARPAL TUNNEL RELEASE      COLONOSCOPY N/A 12/04/2019    Procedure: COLONOSCOPY;  Surgeon: Lyubov Root MD;  Location: [facility];  Service: Endoscopy;  Laterality: N/A;    CORONARY ANGIOPLASTY WITH " STENT PLACEMENT      HYSTERECTOMY      SHOULDER SURGERY           Family History:  Family History   Problem Relation Age of Onset    Colon cancer Neg Hx        Social History:  Social History     Tobacco Use    Smoking status: Never Smoker    Smokeless tobacco: Never Used   Substance and Sexual Activity    Alcohol use: No    Drug use: No    Sexual activity: Not on file        Review of Systems   Review of Systems   Constitutional: Negative for fever.   HENT: Positive for ear pain (Left ear.). Negative for sore throat.    Respiratory: Positive for shortness of breath.    Cardiovascular: Positive for chest pain. Negative for palpitations and leg swelling.   Gastrointestinal: Negative for abdominal pain, nausea and vomiting.   Genitourinary: Negative for dysuria.   Musculoskeletal: Negative for back pain.   Skin: Negative for rash.   Neurological: Negative for weakness.   Hematological: Does not bruise/bleed easily.          Physical Exam     Initial Vitals [04/05/22 1008]   BP Pulse Resp Temp SpO2   131/72 (!) 52 16 98.3 °F (36.8 °C) 98 %      MAP       --          Physical Exam    Nursing Notes and Vital Signs Reviewed.  Constitutional: Patient is in no apparent distress. Well-developed and well-nourished.  Head: Atraumatic. Normocephalic.  Eyes: PERRL. EOM intact. Conjunctivae are not pale. No scleral icterus.  ENT: Mucous membranes are moist. Oropharynx is clear and symmetric.  left  Neck: Supple. Full ROM. No lymphadenopathy.  Cardiovascular: Regular rate. Regular rhythm. No murmurs, rubs, or gallops. Distal pulses are 2+ and symmetric.  Pulmonary/Chest: No respiratory distress. Clear to auscultation bilaterally. No wheezing or rales.  Abdominal: Soft and non-distended.  There is no tenderness.  No rebound, guarding, or rigidity. Good bowel sounds.  Genitourinary: No CVA tenderness  Musculoskeletal: Moves all extremities. No obvious deformities. No edema. No calf tenderness.  Skin: Warm and  "dry.  Neurological:  Alert, awake, and appropriate.  Normal speech.  No acute focal neurological deficits are appreciated.  Psychiatric: Normal affect. Good eye contact. Appropriate in content.     ED Course     ED Procedures:  Procedures    ED Vital Signs:  Vitals:    04/05/22 1008 04/05/22 1010 04/05/22 1023 04/05/22 1113   BP: 131/72   104/66   Pulse: (!) 52 (!) 52 (!) 53 (!) 54   Resp: 16   17   Temp: 98.3 °F (36.8 °C)      TempSrc: Oral      SpO2: 98% 99%  99%   Weight: 65 kg (143 lb 4.8 oz)      Height: 5' 1" (1.549 m)       04/05/22 1133 04/05/22 1147 04/05/22 1202 04/05/22 1218   BP: 117/69 123/67 116/71 119/71   Pulse: (!) 53 (!) 52 (!) 51 (!) 57   Resp: 15 18 20 14   Temp:       TempSrc:       SpO2: 97% 97% 96% 96%   Weight:       Height:        04/05/22 1302 04/05/22 1333 04/05/22 1402 04/05/22 1417   BP: 120/71 110/68 117/62 111/63   Pulse: (!) 51 (!) 55 (!) 50 (!) 52   Resp: 14 19 20 18   Temp:    98.1 °F (36.7 °C)   TempSrc:    Oral   SpO2: 97% 97% 98% 97%   Weight:       Height:        04/05/22 1425   BP: 111/63   Pulse: (!) 52   Resp: 18   Temp: 98.1 °F (36.7 °C)   TempSrc: Oral   SpO2:    Weight:    Height:        Abnormal Lab Results:  Labs Reviewed   COMPREHENSIVE METABOLIC PANEL - Abnormal; Notable for the following components:       Result Value    Alkaline Phosphatase 136 (*)     All other components within normal limits   B-TYPE NATRIURETIC PEPTIDE - Abnormal; Notable for the following components:     (*)     All other components within normal limits   CBC W/ AUTO DIFFERENTIAL   TROPONIN I   PROTIME-INR        All Lab Results:  Results for orders placed or performed during the hospital encounter of 04/05/22   CBC auto differential   Result Value Ref Range    WBC 5.05 3.90 - 12.70 K/uL    RBC 4.86 4.00 - 5.40 M/uL    Hemoglobin 13.6 12.0 - 16.0 g/dL    Hematocrit 40.9 37.0 - 48.5 %    MCV 84 82 - 98 fL    MCH 28.0 27.0 - 31.0 pg    MCHC 33.3 32.0 - 36.0 g/dL    RDW 13.7 11.5 - 14.5 %    " Platelets 188 150 - 450 K/uL    MPV 12.5 9.2 - 12.9 fL    Immature Granulocytes 0.0 0.0 - 0.5 %    Gran # (ANC) 2.4 1.8 - 7.7 K/uL    Immature Grans (Abs) 0.00 0.00 - 0.04 K/uL    Lymph # 1.9 1.0 - 4.8 K/uL    Mono # 0.5 0.3 - 1.0 K/uL    Eos # 0.2 0.0 - 0.5 K/uL    Baso # 0.04 0.00 - 0.20 K/uL    nRBC 0 0 /100 WBC    Gran % 48.1 38.0 - 73.0 %    Lymph % 37.4 18.0 - 48.0 %    Mono % 9.7 4.0 - 15.0 %    Eosinophil % 4.0 0.0 - 8.0 %    Basophil % 0.8 0.0 - 1.9 %    Differential Method Automated    Comprehensive metabolic panel   Result Value Ref Range    Sodium 142 136 - 145 mmol/L    Potassium 4.5 3.5 - 5.1 mmol/L    Chloride 104 95 - 110 mmol/L    CO2 26 23 - 29 mmol/L    Glucose 98 70 - 110 mg/dL    BUN 15 8 - 23 mg/dL    Creatinine 0.9 0.5 - 1.4 mg/dL    Calcium 9.6 8.7 - 10.5 mg/dL    Total Protein 7.4 6.0 - 8.4 g/dL    Albumin 3.9 3.5 - 5.2 g/dL    Total Bilirubin 0.5 0.1 - 1.0 mg/dL    Alkaline Phosphatase 136 (H) 55 - 135 U/L    AST 24 10 - 40 U/L    ALT 17 10 - 44 U/L    Anion Gap 12 8 - 16 mmol/L    eGFR if African American >60.0 >60 mL/min/1.73 m^2    eGFR if non African American >60.0 >60 mL/min/1.73 m^2   Brain natriuretic peptide   Result Value Ref Range     (H) 0 - 99 pg/mL   Troponin I   Result Value Ref Range    Troponin I 0.010 0.000 - 0.026 ng/mL   Protime-INR   Result Value Ref Range    Prothrombin Time 11.2 9.0 - 12.5 sec    INR 1.1 0.8 - 1.2         The EKG was ordered, reviewed, and independently interpreted by the ED provider.      ECG Results          EKG 12-lead (Preliminary result)  Result time 04/05/22 10:15:10    ED Interpretation by Linnea Olsen DO (04/05/22 10:15:10, [facility] - Emergency Dept, Emergency Medicine)    EKG:  Rate of 52 beats per minute.  Sinus bradycardia.  Left axis deviation.  There is T-wave inversion noted in 2, 3, AVF, V3 through V6.  This is compared to EKG dated December 17, 2020. T-wave inversion in lead 2, V5 and V6 is new compared to previous                               Imaging Results:  Imaging Results          X-Ray Chest PA And Lateral (Final result)  Result time 04/05/22 10:39:56    Final result by Aguilar Ellison MD (04/05/22 10:39:56)                 Impression:      No acute process seen.      Electronically signed by: Aguilar Ellison MD  Date:    04/05/2022  Time:    10:39             Narrative:    EXAMINATION:  XR CHEST PA AND LATERAL    CLINICAL HISTORY:  Chest Pain;    COMPARISON:  12/17/2020.    FINDINGS:  Cardiac silhouette is normal.  The lungs demonstrate no evidence of active disease.  No evidence of pleural effusion or pneumothorax.  Bones appear intact with scattered degenerative change.   Aorta demonstrates atherosclerotic disease. Tortuosity of the descending aorta can be seen with chronic hypertension.                                      The Emergency Provider reviewed the vital signs and test results, which are outlined above.     ED Discussion     ED Course as of 04/05/22 1509   Tue Apr 05, 2022   1027 Discussed with patient and granddaughter recommendations for observation as she is high risk for coronary artery disease.  Discussed with patient that [facility] has capabilities of caring for her.  Awaiting decision on facility of choice [LB]   1050 Patient would like to be transferred to [facility] [LB]   1100 Troponin I: 0.010 [LB]   1130 [facility] is at capacity.  Patient is now requesting transfer to [facility]. [LB]   1140 Case discussed with Dr. Papa Connor, Cardiology.   He agrees with POC. [LB]   1202 Spoke with Maude Tong NP.  Observation, 3rd floor.  Dr. Everett. [LB]      ED Course User Index  [LB] Linnea Olsen DO       12:06 PM  Patient/Family requests transfer to: [facility].    All historical, clinical, radiographic, and laboratory findings were reviewed with the patient/family in detail.  I discussed the indications and treatment need (serial cardiac enzymes) for transfer to  "our facility in [place].  Patient/family verbalized understanding.   All remaining questions and concerns were addressed at that time and the patient/family agrees to proceed accordingly.  Similarly all pertinent details of the encounter were discussed with Maude Tong NP at [facility].  Dr. ODALIS Everett who agrees to accept the patient in transfer based on the needs/patient preferences outlined above.  Patient will be [facility] by [facility] secondary to a need for ongoing cardiac monitoring en route.  Risks: of transfer:    loss of vitals signs, permanent neurologic damage, MVC, inclement weather resulting in death, or loss of neurologic function.  Benefits of transfer: labs, cardaic monitoring.  Patient and family agree and verbalize understanding.     Linnea Olsen, DO,  FACEP          ED Medication(s):  Medications   atorvastatin tablet 20 mg (has no administration in time range)             MIPS Measures     Smoker? No     Hypertension: History of Hypertension: The patient has elevated blood pressure (higher than 120/80) while being treated in the ED but has a history of hypertension.       Medical Decision Making           Additional MDM:   Heart Score:    History:          Moderately suspicious.  ECG:             Nonspecific repolarisation disturbance  Age:               >65 years  Risk factors: >= 3 risk factors or history of atherosclerotic disease  Troponin:       Less than or equal to normal limit  Final Score: 6           MDM  Reviewed: nursing note and vitals  Interpretation: labs, ECG and x-ray          Portions of this note may have been created with voice recognition software. Occasional "wrong-word" or "sound-a-like" substitutions may have occurred due to the inherent limitations of voice recognition software. Please, read the note carefully and recognize, using context, where substitutions have occurred.            Clinical Impression       ICD-10-CM ICD-9-CM   1. Chest pain "  R07.9 786.50         ED Disposition       Disposition: Admit to Observation Unit, Dr. Everett  Patient condition: Stable               Linnea Olsen, DO  04/05/22 1503

## 2022-04-05 NOTE — ASSESSMENT & PLAN NOTE
- Last saw An (Cards) in February 2020 - states she has not f/u'd d/t COVID.  - Troponin 0.010. EKG showed sinus bradycardia with sinus arrhythmia. CP now 5/10, but stable.   - Tele monitor    - ASA, Imdur, Norvasc and statin.    - Trend serial cardiac enzymes and EKG.  - Check FLP.  - Will obtain 2D echo.  - Cardiology consult (Connor pepe)    - NPO p MN    - Pt high risk for CAD but has not been previously diagnosed  - F/U An outpatient once discharged

## 2022-04-05 NOTE — ASSESSMENT & PLAN NOTE
Hemoglobin A1C   Date Value Ref Range Status   10/22/2021 6.0 % Final   07/22/2021 6.3 % Final   03/26/2021 6.0 % Final     -Does not take oral antidiabetics or insulin. Controlled with diet.  - Accuchecks with low dose SSI.  - Diabetic diet.  - Check HbA1c.   -Continue gabapentin

## 2022-04-05 NOTE — HPI
"Odalis Rosas is a 76-year-old  female with a PMHx of Hypertension, CAD, Hyperlipidemia and DM II who presented to the ED with c/o substernal chest pain that started a few hours PTA. Pain is moderate in intensity, radiating to her L ear which she describes as feeling "like toothpicks in your ear", and nonexertional. States the pain was 10/10 at the time of onset. Associated symptoms include feeling hot and nauseated. Denies any SOB/GONSALES, V/D, neck or jaw pain, upper extremity pain, numbness/tingling, palpitations, cough, ABD pain, back pain, HA, lightheadedness, dizziness, syncope, weakness, fever or chills. She took 81 mg ASA at home which resolved her chest pain. At present her chest pain is 5/10 and the radiating L ear pain has resolved. Initial work-up showed: Troponin 0.010, , unremarkable CXR, and EKG showed sinus bradycardia with sinus arrhythmia, possible left atrial enlargement, left anterior fascicular block, LVH, Cannot rule out Septal infarct. Case was discussed with Cardiology (Nikolas) per the ED. Hospital Medicine was contacted for admission and patient placed in Observation. Full code status and SDM is her daughter, Indigo Deal, at [phone].   "

## 2022-04-05 NOTE — SUBJECTIVE & OBJECTIVE
Past Medical History:   Diagnosis Date    Allergic rhinitis     Arthritis     Depression     DM II (diabetes mellitus, type II), controlled     GERD (gastroesophageal reflux disease)     HLD (hyperlipidemia)     Hyperlipidemia     Hypertension     Lumbago     Vitamin D deficiency        Past Surgical History:   Procedure Laterality Date    APPENDECTOMY      BREAST BIOPSY      CARPAL TUNNEL RELEASE      COLONOSCOPY N/A 12/04/2019    Procedure: COLONOSCOPY;  Surgeon: Lyubov Root MD;  Location: [facility];  Service: Endoscopy;  Laterality: N/A;    CORONARY ANGIOPLASTY WITH STENT PLACEMENT      HYSTERECTOMY      SHOULDER SURGERY         Review of patient's allergies indicates:   Allergen Reactions    Acidophilus-pectin, citrus Itching    Codeine Itching       No current facility-administered medications on file prior to encounter.     Current Outpatient Medications on File Prior to Encounter   Medication Sig    aspirin 81 MG Chew Take 81 mg by mouth once daily.    atorvastatin (LIPITOR) 20 MG tablet TAKE ONE TABLET BY MOUTH EVERY EVENING    gabapentin (NEURONTIN) 100 MG capsule Take 1 capsule (100 mg total) by mouth every evening.    isosorbide mononitrate (IMDUR) 30 MG 24 hr tablet TAKE ONE TABLET BY MOUTH ONCE DAILY    latanoprost 0.005 % ophthalmic solution 1 drop every evening.    levocetirizine (XYZAL) 5 MG tablet TAKE ONE TABLET BY MOUTH ONCE DAILY    meloxicam (MOBIC) 7.5 MG tablet Take 1 tablet (7.5 mg total) by mouth 2 (two) times daily as needed for Pain.    montelukast (SINGULAIR) 10 mg tablet TAKE ONE TABLET BY MOUTH ONCE DAILY AS NEEDED FOR ALLERGIES    paroxetine (PAXIL) 20 MG tablet TAKE ONE TABLET BY MOUTH EVERY MORNING    valsartan (DIOVAN) 160 MG tablet Take 320 mg by mouth once daily.    verapamiL (VERELAN) 240 MG C24P TAKE 1 CAPSULE BY MOUTH EVERY EVENING    aluminum-magnesium hydroxide-simethicone (MAALOX) 200-200-20 mg/5 mL Susp Take 30 mLs by mouth 4 (four) times daily before meals and  nightly.    amLODIPine (NORVASC) 2.5 MG tablet     CLEARLAX 17 gram/dose powder MIX 1 CAPFULL IN 4 OUNCE OF LIQUID ONCE DAILY OR EVERY OTHER DAY AS NEEDED FOR CONSTIPATION    desonide (DESOWEN) 0.05 % lotion Apply topically 2 (two) times daily as needed (rash).    fluticasone propionate (FLONASE) 50 mcg/actuation nasal spray USE TWO SPRAYS INTO EACH NOSTRIL ONCE DAILY AS NEEDED FOR RHINITIS    mupirocin (BACTROBAN) 2 % ointment Apply topically 3 (three) times daily as needed (open sores).    nitrofurantoin, macrocrystal-monohydrate, (MACROBID) 100 MG capsule Take 1 capsule (100 mg total) by mouth 2 (two) times daily.    omeprazole (PRILOSEC) 20 MG capsule Take 1 capsule (20 mg total) by mouth once daily.    [DISCONTINUED] OLANZapine (ZYPREXA) 7.5 MG tablet Take 7.5 mg by mouth every evening.     Family History    Reviewed and not pertinent        Tobacco Use    Smoking status: Former Smoker    Smokeless tobacco: Never Used   Substance and Sexual Activity    Alcohol use: No    Drug use: No    Sexual activity: Not on file     Review of Systems   Constitutional:  Negative for chills and fever.   HENT:  Negative for congestion, sinus pressure, sinus pain and trouble swallowing.    Eyes: Negative.    Respiratory:  Negative for cough, shortness of breath and wheezing.    Cardiovascular:  Positive for chest pain. Negative for palpitations and leg swelling.   Gastrointestinal:  Positive for nausea. Negative for abdominal pain, diarrhea and vomiting.   Genitourinary: Negative.    Musculoskeletal: Negative.    Skin: Negative.    Neurological:  Negative for dizziness, syncope, speech difficulty, weakness, numbness and headaches.   Hematological: Negative.    Psychiatric/Behavioral:  Negative for confusion and decreased concentration.    All other systems reviewed and are negative.  Objective:     Vital Signs (Most Recent):  Temp: 98.8 °F (37.1 °C) (04/05/22 1602)  Pulse: (!) 53 (04/05/22 1602)  Resp: 18 (04/05/22 1602)  BP:  121/73 (04/05/22 1602)  SpO2: 97 % (04/05/22 1602)   Vital Signs (24h Range):  Temp:  [98.1 °F (36.7 °C)-98.8 °F (37.1 °C)] 98.8 °F (37.1 °C)  Pulse:  [50-57] 53  Resp:  [14-20] 18  SpO2:  [96 %-99 %] 97 %  BP: (104-131)/(62-73) 121/73     Weight: 64.9 kg (143 lb)  Body mass index is 27.02 kg/m².    Physical Exam  Vitals and nursing note reviewed.   Constitutional:       General: She is not in acute distress.     Appearance: Normal appearance. She is not ill-appearing.   HENT:      Head: Normocephalic and atraumatic.      Left Ear: Ear canal and external ear normal.      Nose: Nose normal.      Mouth/Throat:      Mouth: Mucous membranes are moist.   Eyes:      Extraocular Movements: Extraocular movements intact.      Pupils: Pupils are equal, round, and reactive to light.   Cardiovascular:      Rate and Rhythm: Bradycardia present.   Pulmonary:      Effort: Pulmonary effort is normal.      Breath sounds: Normal breath sounds and air entry. No wheezing, rhonchi or rales.   Abdominal:      General: Bowel sounds are normal.      Palpations: Abdomen is soft.      Tenderness: There is no abdominal tenderness. There is no guarding or rebound.   Musculoskeletal:      Cervical back: Full passive range of motion without pain.      Right lower leg: No edema.      Left lower leg: No edema.   Skin:     General: Skin is warm.      Capillary Refill: Capillary refill takes less than 2 seconds.   Neurological:      Mental Status: She is alert and oriented to person, place, and time.      Cranial Nerves: Cranial nerves are intact.   Psychiatric:         Attention and Perception: Attention normal.         Mood and Affect: Mood normal.         Speech: Speech normal.         Behavior: Behavior normal. Behavior is cooperative.         CRANIAL NERVES     CN III, IV, VI   Pupils are equal, round, and reactive to light.     Significant Labs:   Results for orders placed or performed during the hospital encounter of 04/05/22   CBC auto  differential   Result Value Ref Range    WBC 5.05 3.90 - 12.70 K/uL    RBC 4.86 4.00 - 5.40 M/uL    Hemoglobin 13.6 12.0 - 16.0 g/dL    Hematocrit 40.9 37.0 - 48.5 %    MCV 84 82 - 98 fL    MCH 28.0 27.0 - 31.0 pg    MCHC 33.3 32.0 - 36.0 g/dL    RDW 13.7 11.5 - 14.5 %    Platelets 188 150 - 450 K/uL    MPV 12.5 9.2 - 12.9 fL    Immature Granulocytes 0.0 0.0 - 0.5 %    Gran # (ANC) 2.4 1.8 - 7.7 K/uL    Immature Grans (Abs) 0.00 0.00 - 0.04 K/uL    Lymph # 1.9 1.0 - 4.8 K/uL    Mono # 0.5 0.3 - 1.0 K/uL    Eos # 0.2 0.0 - 0.5 K/uL    Baso # 0.04 0.00 - 0.20 K/uL    nRBC 0 0 /100 WBC    Gran % 48.1 38.0 - 73.0 %    Lymph % 37.4 18.0 - 48.0 %    Mono % 9.7 4.0 - 15.0 %    Eosinophil % 4.0 0.0 - 8.0 %    Basophil % 0.8 0.0 - 1.9 %    Differential Method Automated    Comprehensive metabolic panel   Result Value Ref Range    Sodium 142 136 - 145 mmol/L    Potassium 4.5 3.5 - 5.1 mmol/L    Chloride 104 95 - 110 mmol/L    CO2 26 23 - 29 mmol/L    Glucose 98 70 - 110 mg/dL    BUN 15 8 - 23 mg/dL    Creatinine 0.9 0.5 - 1.4 mg/dL    Calcium 9.6 8.7 - 10.5 mg/dL    Total Protein 7.4 6.0 - 8.4 g/dL    Albumin 3.9 3.5 - 5.2 g/dL    Total Bilirubin 0.5 0.1 - 1.0 mg/dL    Alkaline Phosphatase 136 (H) 55 - 135 U/L    AST 24 10 - 40 U/L    ALT 17 10 - 44 U/L    Anion Gap 12 8 - 16 mmol/L    eGFR if African American >60.0 >60 mL/min/1.73 m^2    eGFR if non African American >60.0 >60 mL/min/1.73 m^2   Brain natriuretic peptide   Result Value Ref Range     (H) 0 - 99 pg/mL   Troponin I   Result Value Ref Range    Troponin I 0.010 0.000 - 0.026 ng/mL   Protime-INR   Result Value Ref Range    Prothrombin Time 11.2 9.0 - 12.5 sec    INR 1.1 0.8 - 1.2   Echo   Result Value Ref Range    BSA 1.67 m2    TDI SEPTAL 0.05 m/s    LV LATERAL E/E' RATIO 6.10 m/s    LV SEPTAL E/E' RATIO 12.20 m/s    LA WIDTH 4.43 cm    IVC diameter 1.3 cm    Left Ventricular Outflow Tract Mean Velocity 0.3796139646 cm/s    Left Ventricular Outflow Tract Mean  Gradient 3.81 mmHg    TV mean gradient 15 mmHg    AORTIC VALVE CUSP SEPERATION 0 cm    TDI LATERAL 0.10 m/s    LVIDd 3.59 3.5 - 6.0 cm    IVS 1.18 (A) 0.6 - 1.1 cm    Posterior Wall 1.12 (A) 0.6 - 1.1 cm    Ao root annulus 2.81 cm    LVIDs 2.15 2.1 - 4.0 cm    FS 40 28 - 44 %    STJ 3.21 cm    Ascending aorta 3.24 cm    LV mass 132.11 g    LA size 3.33 cm    TAPSE 2.10 cm    Left Ventricle Relative Wall Thickness 0.62 cm    AV mean gradient 6 mmHg    AV valve area 2.37 cm2    AV Velocity Ratio 0.72     AV index (prosthetic) 0.77     MV valve area p 1/2 method 3.20 cm2    E/A ratio 0.91     Mean e' 0.08 m/s    E wave deceleration time 236.870560489781896 msec    IVRT 79.781839013231235 msec    LVOT diameter 1.98 cm    LVOT area 3.1 cm2    LVOT peak jack 1.16 m/s    LVOT peak VTI 23.70 cm    Ao peak jack 1.61 m/s    Ao VTI 30.8 cm    RVOT peak jack 1.02 m/s    RVOT peak VTI 23.9 cm    LVOT stroke volume 72.94 cm3    AV peak gradient 10 mmHg    PV mean gradient 2.85 mmHg    E/E' ratio 8.13 m/s    MV Peak E Jack 0.61 m/s    TR Max Jack 2.43 m/s    MV stenosis pressure 1/2 time 68.404506233504391 ms    MV Peak A Jack 0.67 m/s    LV Systolic Volume 15.38 mL    LV Systolic Volume Index 9.4 mL/m2    LV Diastolic Volume 53.92 mL    LV Diastolic Volume Index 32.88 mL/m2    LV Mass Index 81 g/m2    Echo EF Estimated 71 %    RA Major Axis 3.43 cm    Left Atrium Minor Axis 3.50 cm    Triscuspid Valve Regurgitation Peak Gradient 24 mmHg   POCT glucose   Result Value Ref Range    POCT Glucose 91 70 - 110 mg/dL        Significant Imaging:   Imaging Results              X-Ray Chest PA And Lateral (Final result)  Result time 04/05/22 10:39:56      Final result by Aguilar Ellison MD (04/05/22 10:39:56)                   Impression:      No acute process seen.      Electronically signed by: Aguilar Ellison MD  Date:    04/05/2022  Time:    10:39               Narrative:    EXAMINATION:  XR CHEST PA AND LATERAL    CLINICAL HISTORY:  Chest  Pain;    COMPARISON:  12/17/2020.    FINDINGS:  Cardiac silhouette is normal.  The lungs demonstrate no evidence of active disease.  No evidence of pleural effusion or pneumothorax.  Bones appear intact with scattered degenerative change.   Aorta demonstrates atherosclerotic disease. Tortuosity of the descending aorta can be seen with chronic hypertension.

## 2022-04-06 VITALS
OXYGEN SATURATION: 97 % | WEIGHT: 149 LBS | SYSTOLIC BLOOD PRESSURE: 134 MMHG | HEART RATE: 51 BPM | HEIGHT: 61 IN | DIASTOLIC BLOOD PRESSURE: 84 MMHG | TEMPERATURE: 98 F | BODY MASS INDEX: 28.13 KG/M2 | RESPIRATION RATE: 18 BRPM

## 2022-04-06 PROBLEM — R94.31 ABNORMAL ECG: Status: ACTIVE | Noted: 2022-04-06

## 2022-04-06 PROBLEM — I20.9 AP (ANGINA PECTORIS): Status: ACTIVE | Noted: 2022-04-06

## 2022-04-06 PROBLEM — Z98.61 HISTORY OF PTCA: Status: ACTIVE | Noted: 2022-04-06

## 2022-04-06 PROBLEM — I25.10 CAD IN NATIVE ARTERY: Status: ACTIVE | Noted: 2022-04-06

## 2022-04-06 LAB
ALBUMIN SERPL BCP-MCNC: 3.7 G/DL (ref 3.5–5.2)
ALP SERPL-CCNC: 128 U/L (ref 55–135)
ALT SERPL W/O P-5'-P-CCNC: 14 U/L (ref 10–44)
ANION GAP SERPL CALC-SCNC: 12 MMOL/L (ref 8–16)
AORTIC ROOT ANNULUS: 2.81 CM
AORTIC VALVE CUSP SEPERATION: 0 CM
ASCENDING AORTA: 3.24 CM
AST SERPL-CCNC: 24 U/L (ref 10–40)
AV INDEX (PROSTH): 0.77
AV MEAN GRADIENT: 6 MMHG
AV PEAK GRADIENT: 10 MMHG
AV VALVE AREA: 2.37 CM2
AV VELOCITY RATIO: 0.72
BASOPHILS # BLD AUTO: 0.05 K/UL (ref 0–0.2)
BASOPHILS NFR BLD: 0.9 % (ref 0–1.9)
BILIRUB SERPL-MCNC: 0.7 MG/DL (ref 0.1–1)
BILIRUB UR QL STRIP: NEGATIVE
BSA FOR ECHO PROCEDURE: 1.67 M2
BUN SERPL-MCNC: 23 MG/DL (ref 8–23)
CALCIUM SERPL-MCNC: 9.5 MG/DL (ref 8.7–10.5)
CHLORIDE SERPL-SCNC: 104 MMOL/L (ref 95–110)
CHOLEST SERPL-MCNC: 110 MG/DL (ref 120–199)
CHOLEST/HDLC SERPL: 2.2 {RATIO} (ref 2–5)
CLARITY UR: CLEAR
CO2 SERPL-SCNC: 25 MMOL/L (ref 23–29)
COLOR UR: YELLOW
CREAT SERPL-MCNC: 1.2 MG/DL (ref 0.5–1.4)
CV ECHO LV RWT: 0.62 CM
CV STRESS BASE HR: 51 BPM
DIASTOLIC BLOOD PRESSURE: 84 MMHG
DIFFERENTIAL METHOD: ABNORMAL
DOP CALC AO PEAK VEL: 1.61 M/S
DOP CALC AO VTI: 30.8 CM
DOP CALC LVOT AREA: 3.1 CM2
DOP CALC LVOT DIAMETER: 1.98 CM
DOP CALC LVOT PEAK VEL: 1.16 M/S
DOP CALC LVOT STROKE VOLUME: 72.94 CM3
DOP CALC RVOT PEAK VEL: 1.02 M/S
DOP CALC RVOT VTI: 23.9 CM
DOP CALCLVOT PEAK VEL VTI: 23.7 CM
E WAVE DECELERATION TIME: 236.95 MSEC
E/A RATIO: 0.91
E/E' RATIO: 8.13 M/S
ECHO EF ESTIMATED: 71 %
ECHO LV POSTERIOR WALL: 1.12 CM (ref 0.6–1.1)
EJECTION FRACTION- HIGH: 73 %
EJECTION FRACTION: 65 %
END DIASTOLIC INDEX-HIGH: 165 ML/M2
END DIASTOLIC INDEX-LOW: 101 ML/M2
END SYSTOLIC INDEX-HIGH: 64 ML/M2
END SYSTOLIC INDEX-LOW: 28 ML/M2
EOSINOPHIL # BLD AUTO: 0.2 K/UL (ref 0–0.5)
EOSINOPHIL NFR BLD: 3.8 % (ref 0–8)
ERYTHROCYTE [DISTWIDTH] IN BLOOD BY AUTOMATED COUNT: 13.5 % (ref 11.5–14.5)
EST. GFR  (AFRICAN AMERICAN): 51 ML/MIN/1.73 M^2
EST. GFR  (NON AFRICAN AMERICAN): 45 ML/MIN/1.73 M^2
ESTIMATED AVG GLUCOSE: 117 MG/DL (ref 68–131)
FRACTIONAL SHORTENING: 40 % (ref 28–44)
GLUCOSE SERPL-MCNC: 93 MG/DL (ref 70–110)
GLUCOSE UR QL STRIP: NEGATIVE
HBA1C MFR BLD: 5.7 % (ref 4–5.6)
HCT VFR BLD AUTO: 39.3 % (ref 37–48.5)
HDLC SERPL-MCNC: 49 MG/DL (ref 40–75)
HDLC SERPL: 44.5 % (ref 20–50)
HGB BLD-MCNC: 13.1 G/DL (ref 12–16)
HGB UR QL STRIP: NEGATIVE
IMM GRANULOCYTES # BLD AUTO: 0 K/UL (ref 0–0.04)
IMM GRANULOCYTES NFR BLD AUTO: 0 % (ref 0–0.5)
INTERVENTRICULAR SEPTUM: 1.18 CM (ref 0.6–1.1)
IVC DIAMETER: 1.3 CM
IVRT: 79.58 MSEC
KETONES UR QL STRIP: NEGATIVE
LA MINOR: 3.5 CM
LA WIDTH: 4.43 CM
LDLC SERPL CALC-MCNC: 50.2 MG/DL (ref 63–159)
LEFT ATRIUM SIZE: 3.33 CM
LEFT INTERNAL DIMENSION IN SYSTOLE: 2.15 CM (ref 2.1–4)
LEFT VENTRICLE DIASTOLIC VOLUME INDEX: 32.88 ML/M2
LEFT VENTRICLE DIASTOLIC VOLUME: 53.92 ML
LEFT VENTRICLE MASS INDEX: 81 G/M2
LEFT VENTRICLE SYSTOLIC VOLUME INDEX: 9.4 ML/M2
LEFT VENTRICLE SYSTOLIC VOLUME: 15.38 ML
LEFT VENTRICULAR INTERNAL DIMENSION IN DIASTOLE: 3.59 CM (ref 3.5–6)
LEFT VENTRICULAR MASS: 132.11 G
LEUKOCYTE ESTERASE UR QL STRIP: NEGATIVE
LV LATERAL E/E' RATIO: 6.1 M/S
LV SEPTAL E/E' RATIO: 12.2 M/S
LVOT MG: 3.81 MMHG
LVOT MV: 0.94 CM/S
LYMPHOCYTES # BLD AUTO: 2.5 K/UL (ref 1–4.8)
LYMPHOCYTES NFR BLD: 48.1 % (ref 18–48)
MCH RBC QN AUTO: 27.9 PG (ref 27–31)
MCHC RBC AUTO-ENTMCNC: 33.3 G/DL (ref 32–36)
MCV RBC AUTO: 84 FL (ref 82–98)
MONOCYTES # BLD AUTO: 0.6 K/UL (ref 0.3–1)
MONOCYTES NFR BLD: 11 % (ref 4–15)
MV PEAK A VEL: 0.67 M/S
MV PEAK E VEL: 0.61 M/S
MV STENOSIS PRESSURE HALF TIME: 68.72 MS
MV VALVE AREA P 1/2 METHOD: 3.2 CM2
NEUTROPHILS # BLD AUTO: 1.9 K/UL (ref 1.8–7.7)
NEUTROPHILS NFR BLD: 36.2 % (ref 38–73)
NITRITE UR QL STRIP: NEGATIVE
NONHDLC SERPL-MCNC: 61 MG/DL
NRBC BLD-RTO: 0 /100 WBC
NUC REST EJECTION FRACTION: 76
NUC STRESS EJECTION FRACTION: 74 %
OHS CV CPX 85 PERCENT MAX PREDICTED HEART RATE MALE: 120
OHS CV CPX MAX PREDICTED HEART RATE: 141
OHS CV CPX PATIENT IS FEMALE: 1
OHS CV CPX PATIENT IS MALE: 0
OHS CV CPX PEAK DIASTOLIC BLOOD PRESSURE: 84 MMHG
OHS CV CPX PEAK HEAR RATE: 96 BPM
OHS CV CPX PEAK RATE PRESSURE PRODUCT: NORMAL
OHS CV CPX PEAK SYSTOLIC BLOOD PRESSURE: 134 MMHG
OHS CV CPX PERCENT MAX PREDICTED HEART RATE ACHIEVED: 68
OHS CV CPX RATE PRESSURE PRODUCT PRESENTING: 6834
PH UR STRIP: 6 [PH] (ref 5–8)
PISA TR MAX VEL: 2.43 M/S
PLATELET # BLD AUTO: 175 K/UL (ref 150–450)
PMV BLD AUTO: 12 FL (ref 9.2–12.9)
POCT GLUCOSE: 145 MG/DL (ref 70–110)
POCT GLUCOSE: 93 MG/DL (ref 70–110)
POTASSIUM SERPL-SCNC: 4.9 MMOL/L (ref 3.5–5.1)
PROT SERPL-MCNC: 7 G/DL (ref 6–8.4)
PROT UR QL STRIP: NEGATIVE
PV MEAN GRADIENT: 2.85 MMHG
RA MAJOR: 3.43 CM
RA PRESSURE: 3 MMHG
RBC # BLD AUTO: 4.69 M/UL (ref 4–5.4)
RETIRED EF AND QEF - SEE NOTES: 59 %
SODIUM SERPL-SCNC: 141 MMOL/L (ref 136–145)
SP GR UR STRIP: 1.02 (ref 1–1.03)
STJ: 3.21 CM
SYSTOLIC BLOOD PRESSURE: 134 MMHG
TDI LATERAL: 0.1 M/S
TDI SEPTAL: 0.05 M/S
TDI: 0.08 M/S
TR MAX PG: 24 MMHG
TR MEAN GRADIENT: 15 MMHG
TRICUSPID ANNULAR PLANE SYSTOLIC EXCURSION: 2.1 CM
TRIGL SERPL-MCNC: 54 MG/DL (ref 30–150)
TROPONIN I SERPL DL<=0.01 NG/ML-MCNC: 0.01 NG/ML (ref 0–0.03)
TV REST PULMONARY ARTERY PRESSURE: 27 MMHG
URN SPEC COLLECT METH UR: NORMAL
UROBILINOGEN UR STRIP-ACNC: NEGATIVE EU/DL
WBC # BLD AUTO: 5.28 K/UL (ref 3.9–12.7)

## 2022-04-06 PROCEDURE — 80053 COMPREHEN METABOLIC PANEL: CPT | Performed by: NURSE PRACTITIONER

## 2022-04-06 PROCEDURE — 99220 PR INITIAL OBSERVATION CARE,LEVL III: ICD-10-PCS | Mod: ,,, | Performed by: INTERNAL MEDICINE

## 2022-04-06 PROCEDURE — 85025 COMPLETE CBC W/AUTO DIFF WBC: CPT | Performed by: NURSE PRACTITIONER

## 2022-04-06 PROCEDURE — 99220 PR INITIAL OBSERVATION CARE,LEVL III: CPT | Mod: ,,, | Performed by: INTERNAL MEDICINE

## 2022-04-06 PROCEDURE — 81003 URINALYSIS AUTO W/O SCOPE: CPT | Performed by: NURSE PRACTITIONER

## 2022-04-06 PROCEDURE — 36415 COLL VENOUS BLD VENIPUNCTURE: CPT | Performed by: NURSE PRACTITIONER

## 2022-04-06 PROCEDURE — 25000003 PHARM REV CODE 250: Performed by: NURSE PRACTITIONER

## 2022-04-06 PROCEDURE — G0378 HOSPITAL OBSERVATION PER HR: HCPCS

## 2022-04-06 PROCEDURE — 63600175 PHARM REV CODE 636 W HCPCS: Performed by: EMERGENCY MEDICINE

## 2022-04-06 RX ORDER — REGADENOSON 0.08 MG/ML
0.4 INJECTION, SOLUTION INTRAVENOUS ONCE
Status: COMPLETED | OUTPATIENT
Start: 2022-04-06 | End: 2022-04-06

## 2022-04-06 RX ORDER — ISOSORBIDE MONONITRATE 30 MG/1
60 TABLET, EXTENDED RELEASE ORAL DAILY
Status: DISCONTINUED | OUTPATIENT
Start: 2022-04-07 | End: 2022-04-06 | Stop reason: HOSPADM

## 2022-04-06 RX ORDER — ISOSORBIDE MONONITRATE 60 MG/1
60 TABLET, EXTENDED RELEASE ORAL DAILY
Qty: 30 TABLET | Refills: 1 | Status: SHIPPED | OUTPATIENT
Start: 2022-04-07 | End: 2022-06-17 | Stop reason: SDUPTHER

## 2022-04-06 RX ADMIN — PAROXETINE 20 MG: 10 TABLET, FILM COATED ORAL at 06:04

## 2022-04-06 RX ADMIN — REGADENOSON 0.4 MG: 0.08 INJECTION, SOLUTION INTRAVENOUS at 02:04

## 2022-04-06 NOTE — HPI
Cardiology consulted for CP.  Pt followed by outside cardiologist.  Has h/o CAD w PCI, HTN, DM.  Saw outside Neurologist, Dr. Duarte, in past and dx w dementia.  Reports cp yesterday, toothache type feeling.  Went to [facility] ER and tx to [facility].  No recurrent CP.  Negative troponin levels.  .  ECG showed sinus, inferior T wave abnl, LAFB, LVH, possible old septal infarct.  Has h/o chronic abnl ecgs.  Echo today shows normal LV function, no WMA.

## 2022-04-06 NOTE — SUBJECTIVE & OBJECTIVE
Past Medical History:   Diagnosis Date    Allergic rhinitis     Arthritis     Depression     DM II (diabetes mellitus, type II), controlled     GERD (gastroesophageal reflux disease)     HLD (hyperlipidemia)     Hyperlipidemia     Hypertension     Lumbago     Vitamin D deficiency        Past Surgical History:   Procedure Laterality Date    APPENDECTOMY      BREAST BIOPSY      CARPAL TUNNEL RELEASE      COLONOSCOPY N/A 12/04/2019    Procedure: COLONOSCOPY;  Surgeon: Lyubov Root MD;  Location: [facility];  Service: Endoscopy;  Laterality: N/A;    CORONARY ANGIOPLASTY WITH STENT PLACEMENT      HYSTERECTOMY      SHOULDER SURGERY         Review of patient's allergies indicates:   Allergen Reactions    Acidophilus-pectin, citrus Itching    Codeine Itching       No current facility-administered medications on file prior to encounter.     Current Outpatient Medications on File Prior to Encounter   Medication Sig    aspirin 81 MG Chew Take 81 mg by mouth once daily.    atorvastatin (LIPITOR) 20 MG tablet TAKE ONE TABLET BY MOUTH EVERY EVENING    gabapentin (NEURONTIN) 100 MG capsule Take 1 capsule (100 mg total) by mouth every evening.    isosorbide mononitrate (IMDUR) 30 MG 24 hr tablet TAKE ONE TABLET BY MOUTH ONCE DAILY    latanoprost 0.005 % ophthalmic solution 1 drop every evening.    levocetirizine (XYZAL) 5 MG tablet TAKE ONE TABLET BY MOUTH ONCE DAILY    meloxicam (MOBIC) 7.5 MG tablet Take 1 tablet (7.5 mg total) by mouth 2 (two) times daily as needed for Pain.    montelukast (SINGULAIR) 10 mg tablet TAKE ONE TABLET BY MOUTH ONCE DAILY AS NEEDED FOR ALLERGIES    paroxetine (PAXIL) 20 MG tablet TAKE ONE TABLET BY MOUTH EVERY MORNING    valsartan (DIOVAN) 160 MG tablet Take 320 mg by mouth once daily.    verapamiL (VERELAN) 240 MG C24P TAKE 1 CAPSULE BY MOUTH EVERY EVENING    aluminum-magnesium hydroxide-simethicone (MAALOX) 200-200-20 mg/5 mL Susp Take 30 mLs by mouth 4 (four) times daily before meals and  nightly.    amLODIPine (NORVASC) 2.5 MG tablet     CLEARLAX 17 gram/dose powder MIX 1 CAPFULL IN 4 OUNCE OF LIQUID ONCE DAILY OR EVERY OTHER DAY AS NEEDED FOR CONSTIPATION    desonide (DESOWEN) 0.05 % lotion Apply topically 2 (two) times daily as needed (rash).    fluticasone propionate (FLONASE) 50 mcg/actuation nasal spray USE TWO SPRAYS INTO EACH NOSTRIL ONCE DAILY AS NEEDED FOR RHINITIS    mupirocin (BACTROBAN) 2 % ointment Apply topically 3 (three) times daily as needed (open sores).    nitrofurantoin, macrocrystal-monohydrate, (MACROBID) 100 MG capsule Take 1 capsule (100 mg total) by mouth 2 (two) times daily.    omeprazole (PRILOSEC) 20 MG capsule Take 1 capsule (20 mg total) by mouth once daily.     Family History    None       Tobacco Use    Smoking status: Former Smoker    Smokeless tobacco: Never Used   Substance and Sexual Activity    Alcohol use: No    Drug use: No    Sexual activity: Not on file     Review of Systems   Constitutional: Negative.   HENT: Negative.     Eyes: Negative.    Cardiovascular:  Positive for chest pain.   Respiratory: Negative.     Endocrine: Negative.    Hematologic/Lymphatic: Negative.    Skin: Negative.    Musculoskeletal: Negative.    Gastrointestinal: Negative.    Genitourinary: Negative.    Neurological: Negative.    Psychiatric/Behavioral: Negative.     Allergic/Immunologic: Negative.    Objective:     Vital Signs (Most Recent):  Temp: 97.8 °F (36.6 °C) (04/06/22 0752)  Pulse: (!) 54 (04/06/22 0752)  Resp: 18 (04/06/22 0752)  BP: 123/74 (04/06/22 0752)  SpO2: 97 % (04/06/22 0752)   Vital Signs (24h Range):  Temp:  [96.3 °F (35.7 °C)-98.8 °F (37.1 °C)] 97.8 °F (36.6 °C)  Pulse:  [50-61] 54  Resp:  [14-20] 18  SpO2:  [96 %-98 %] 97 %  BP: (106-135)/(62-74) 123/74     Weight: 68 kg (149 lb 14.6 oz)  Body mass index is 28.33 kg/m².    SpO2: 97 %  O2 Device (Oxygen Therapy): room air      Intake/Output Summary (Last 24 hours) at 4/6/2022 1127  Last data filed at 4/6/2022  0600  Gross per 24 hour   Intake --   Output 3 ml   Net -3 ml       Lines/Drains/Airways       Peripheral Intravenous Line  Duration                  Peripheral IV - Single Lumen 04/05/22 1024 20 G Right Antecubital 1 day                    Physical Exam  Vitals and nursing note reviewed.   Constitutional:       General: She is not in acute distress.     Appearance: Normal appearance. She is well-developed. She is not ill-appearing or diaphoretic.   HENT:      Head: Normocephalic.   Neck:      Thyroid: No thyromegaly.      Vascular: Normal carotid pulses. No carotid bruit, hepatojugular reflux or JVD.   Cardiovascular:      Rate and Rhythm: Normal rate and regular rhythm.      Chest Wall: PMI is not displaced.      Pulses: Normal pulses.           Radial pulses are 2+ on the right side and 2+ on the left side.      Heart sounds: Normal heart sounds, S1 normal and S2 normal. No murmur heard.    No friction rub. No gallop.   Pulmonary:      Effort: Pulmonary effort is normal.      Breath sounds: Normal breath sounds. No wheezing or rales.   Abdominal:      General: Bowel sounds are normal. There is no abdominal bruit.      Palpations: Abdomen is soft. There is no hepatomegaly, splenomegaly or mass.      Tenderness: There is no abdominal tenderness.   Musculoskeletal:      Cervical back: Neck supple.   Lymphadenopathy:      Cervical: No cervical adenopathy.   Skin:     General: Skin is warm.   Neurological:      Mental Status: She is alert.   Psychiatric:         Behavior: Behavior normal. Behavior is cooperative.       Significant Labs: ABG: No results for input(s): PH, PCO2, HCO3, POCSATURATED, BE in the last 48 hours.    Significant Imaging: Echocardiogram: Transthoracic echo (TTE) complete (Cupid Only):   Results for orders placed or performed during the hospital encounter of 04/05/22   Echo   Result Value Ref Range    BSA 1.67 m2    TDI SEPTAL 0.05 m/s    LV LATERAL E/E' RATIO 6.10 m/s    LV SEPTAL E/E' RATIO  12.20 m/s    LA WIDTH 4.43 cm    IVC diameter 1.3 cm    Left Ventricular Outflow Tract Mean Velocity 0.1630638627 cm/s    Left Ventricular Outflow Tract Mean Gradient 3.81 mmHg    TV mean gradient 15 mmHg    AORTIC VALVE CUSP SEPERATION 0 cm    TDI LATERAL 0.10 m/s    LVIDd 3.59 3.5 - 6.0 cm    IVS 1.18 (A) 0.6 - 1.1 cm    Posterior Wall 1.12 (A) 0.6 - 1.1 cm    Ao root annulus 2.81 cm    LVIDs 2.15 2.1 - 4.0 cm    FS 40 28 - 44 %    STJ 3.21 cm    Ascending aorta 3.24 cm    LV mass 132.11 g    LA size 3.33 cm    TAPSE 2.10 cm    Left Ventricle Relative Wall Thickness 0.62 cm    AV mean gradient 6 mmHg    AV valve area 2.37 cm2    AV Velocity Ratio 0.72     AV index (prosthetic) 0.77     MV valve area p 1/2 method 3.20 cm2    E/A ratio 0.91     Mean e' 0.08 m/s    E wave deceleration time 236.871459232325557 msec    IVRT 79.467553560876128 msec    LVOT diameter 1.98 cm    LVOT area 3.1 cm2    LVOT peak jack 1.16 m/s    LVOT peak VTI 23.70 cm    Ao peak jack 1.61 m/s    Ao VTI 30.8 cm    RVOT peak jack 1.02 m/s    RVOT peak VTI 23.9 cm    LVOT stroke volume 72.94 cm3    AV peak gradient 10 mmHg    PV mean gradient 2.85 mmHg    E/E' ratio 8.13 m/s    MV Peak E Jack 0.61 m/s    TR Max Jack 2.43 m/s    MV stenosis pressure 1/2 time 68.315652953689801 ms    MV Peak A Jack 0.67 m/s    LV Systolic Volume 15.38 mL    LV Systolic Volume Index 9.4 mL/m2    LV Diastolic Volume 53.92 mL    LV Diastolic Volume Index 32.88 mL/m2    LV Mass Index 81 g/m2    Echo EF Estimated 71 %    RA Major Axis 3.43 cm    Left Atrium Minor Axis 3.50 cm    Triscuspid Valve Regurgitation Peak Gradient 24 mmHg    Right Atrial Pressure (from IVC) 3 mmHg    EF 65 %    TV rest pulmonary artery pressure 27 mmHg    Narrative    · The left ventricle is normal in size with normal systolic function.  · The estimated ejection fraction is 65%.  · Normal left ventricular diastolic function.  · Normal right ventricular size with normal right ventricular systolic    function.  · The estimated PA systolic pressure is 27 mmHg.

## 2022-04-06 NOTE — PLAN OF CARE
[facility] - Med Surg 3  Discharge Final Note    Primary Care Provider: Carlton Calderon MD    Expected Discharge Date: 4/6/2022    Final Discharge Note (most recent)     Final Note - 04/06/22 1445        Final Note    Assessment Type Final Discharge Note     Anticipated Discharge Disposition Home or Self Care     Hospital Resources/Appts/Education Provided Appointments scheduled and added to AVS                 Important Message from Medicare             Contact Info     Carlton Calderon MD   Specialty: Family Medicine   Relationship: PCP - General    [address] [place] [address] MEDICINE  [facility] [id number]   Phone: [phone]       Next Steps: Go in 1 week(s)        Apr13 Established Patient Visit with KIM Castro  Wednesday Apr 13, 2022 9:15 AM

## 2022-04-06 NOTE — CONSULTS
[facility]  Cardiology  Consult Note    Patient Name: Odalis Rosas  MRN: [id number]  Admission Date: 4/5/2022  Hospital Length of Stay: 0 days  Code Status: Full Code   Attending Provider: Mara Chaparro MD   Consulting Provider: Papa Connor MD  Primary Care Physician: Carlton Calderon MD  Principal Problem:Chest pain    Patient information was obtained from patient, past medical records and ER records.     Inpatient consult to Cardiology  Consult performed by: Papa Connor MD  Consult ordered by: Danielle Vargas NP  Reason for consult: CHEST PAIN         Subjective:     Chief Complaint:  CHEST PAIN     HPI:   Cardiology consulted for CP.  Pt followed by outside cardiologist.  Has h/o CAD w PCI, HTN, DM.  Saw outside Neurologist, Dr. Duarte, in past and dx w dementia.  Reports cp yesterday, toothache type feeling.  Went to [facility] ER and tx to [facility] [place]  No recurrent CP.  Negative troponin levels.  .  ECG showed sinus, inferior T wave abnl, LAFB, LVH, possible old septal infarct.  Has h/o chronic abnl ecgs.  Echo today shows normal LV function, no WMA.        Past Medical History:   Diagnosis Date    Allergic rhinitis     Arthritis     Depression     DM II (diabetes mellitus, type II), controlled     GERD (gastroesophageal reflux disease)     HLD (hyperlipidemia)     Hyperlipidemia     Hypertension     Lumbago     Vitamin D deficiency        Past Surgical History:   Procedure Laterality Date    APPENDECTOMY      BREAST BIOPSY      CARPAL TUNNEL RELEASE      COLONOSCOPY N/A 12/04/2019    Procedure: COLONOSCOPY;  Surgeon: Lyubov Root MD;  Location: [facility];  Service: Endoscopy;  Laterality: N/A;    CORONARY ANGIOPLASTY WITH STENT PLACEMENT      HYSTERECTOMY      SHOULDER SURGERY         Review of patient's allergies indicates:   Allergen Reactions    Acidophilus-pectin, citrus Itching    Codeine Itching       No current facility-administered medications on file prior  to encounter.     Current Outpatient Medications on File Prior to Encounter   Medication Sig    aspirin 81 MG Chew Take 81 mg by mouth once daily.    atorvastatin (LIPITOR) 20 MG tablet TAKE ONE TABLET BY MOUTH EVERY EVENING    gabapentin (NEURONTIN) 100 MG capsule Take 1 capsule (100 mg total) by mouth every evening.    isosorbide mononitrate (IMDUR) 30 MG 24 hr tablet TAKE ONE TABLET BY MOUTH ONCE DAILY    latanoprost 0.005 % ophthalmic solution 1 drop every evening.    levocetirizine (XYZAL) 5 MG tablet TAKE ONE TABLET BY MOUTH ONCE DAILY    meloxicam (MOBIC) 7.5 MG tablet Take 1 tablet (7.5 mg total) by mouth 2 (two) times daily as needed for Pain.    montelukast (SINGULAIR) 10 mg tablet TAKE ONE TABLET BY MOUTH ONCE DAILY AS NEEDED FOR ALLERGIES    paroxetine (PAXIL) 20 MG tablet TAKE ONE TABLET BY MOUTH EVERY MORNING    valsartan (DIOVAN) 160 MG tablet Take 320 mg by mouth once daily.    verapamiL (VERELAN) 240 MG C24P TAKE 1 CAPSULE BY MOUTH EVERY EVENING    aluminum-magnesium hydroxide-simethicone (MAALOX) 200-200-20 mg/5 mL Susp Take 30 mLs by mouth 4 (four) times daily before meals and nightly.    amLODIPine (NORVASC) 2.5 MG tablet     CLEARLAX 17 gram/dose powder MIX 1 CAPFULL IN 4 OUNCE OF LIQUID ONCE DAILY OR EVERY OTHER DAY AS NEEDED FOR CONSTIPATION    desonide (DESOWEN) 0.05 % lotion Apply topically 2 (two) times daily as needed (rash).    fluticasone propionate (FLONASE) 50 mcg/actuation nasal spray USE TWO SPRAYS INTO EACH NOSTRIL ONCE DAILY AS NEEDED FOR RHINITIS    mupirocin (BACTROBAN) 2 % ointment Apply topically 3 (three) times daily as needed (open sores).    nitrofurantoin, macrocrystal-monohydrate, (MACROBID) 100 MG capsule Take 1 capsule (100 mg total) by mouth 2 (two) times daily.    omeprazole (PRILOSEC) 20 MG capsule Take 1 capsule (20 mg total) by mouth once daily.     Family History    None       Tobacco Use    Smoking status: Former Smoker    Smokeless tobacco:  Never Used   Substance and Sexual Activity    Alcohol use: No    Drug use: No    Sexual activity: Not on file     Review of Systems   Constitutional: Negative.   HENT: Negative.     Eyes: Negative.    Cardiovascular:  Positive for chest pain.   Respiratory: Negative.     Endocrine: Negative.    Hematologic/Lymphatic: Negative.    Skin: Negative.    Musculoskeletal: Negative.    Gastrointestinal: Negative.    Genitourinary: Negative.    Neurological: Negative.    Psychiatric/Behavioral: Negative.     Allergic/Immunologic: Negative.    Objective:     Vital Signs (Most Recent):  Temp: 97.8 °F (36.6 °C) (04/06/22 0752)  Pulse: (!) 54 (04/06/22 0752)  Resp: 18 (04/06/22 0752)  BP: 123/74 (04/06/22 0752)  SpO2: 97 % (04/06/22 0752)   Vital Signs (24h Range):  Temp:  [96.3 °F (35.7 °C)-98.8 °F (37.1 °C)] 97.8 °F (36.6 °C)  Pulse:  [50-61] 54  Resp:  [14-20] 18  SpO2:  [96 %-98 %] 97 %  BP: (106-135)/(62-74) 123/74     Weight: 68 kg (149 lb 14.6 oz)  Body mass index is 28.33 kg/m².    SpO2: 97 %  O2 Device (Oxygen Therapy): room air      Intake/Output Summary (Last 24 hours) at 4/6/2022 1127  Last data filed at 4/6/2022 0600  Gross per 24 hour   Intake --   Output 3 ml   Net -3 ml       Lines/Drains/Airways       Peripheral Intravenous Line  Duration                  Peripheral IV - Single Lumen 04/05/22 1024 20 G Right Antecubital 1 day                    Physical Exam  Vitals and nursing note reviewed.   Constitutional:       General: She is not in acute distress.     Appearance: Normal appearance. She is well-developed. She is not ill-appearing or diaphoretic.   HENT:      Head: Normocephalic.   Neck:      Thyroid: No thyromegaly.      Vascular: Normal carotid pulses. No carotid bruit, hepatojugular reflux or JVD.   Cardiovascular:      Rate and Rhythm: Normal rate and regular rhythm.      Chest Wall: PMI is not displaced.      Pulses: Normal pulses.           Radial pulses are 2+ on the right side and 2+ on the left  side.      Heart sounds: Normal heart sounds, S1 normal and S2 normal. No murmur heard.    No friction rub. No gallop.   Pulmonary:      Effort: Pulmonary effort is normal.      Breath sounds: Normal breath sounds. No wheezing or rales.   Abdominal:      General: Bowel sounds are normal. There is no abdominal bruit.      Palpations: Abdomen is soft. There is no hepatomegaly, splenomegaly or mass.      Tenderness: There is no abdominal tenderness.   Musculoskeletal:      Cervical back: Neck supple.   Lymphadenopathy:      Cervical: No cervical adenopathy.   Skin:     General: Skin is warm.   Neurological:      Mental Status: She is alert.   Psychiatric:         Behavior: Behavior normal. Behavior is cooperative.       Significant Labs: ABG: No results for input(s): PH, PCO2, HCO3, POCSATURATED, BE in the last 48 hours.    Significant Imaging: Echocardiogram: Transthoracic echo (TTE) complete (Cupid Only):   Results for orders placed or performed during the hospital encounter of 04/05/22   Echo   Result Value Ref Range    BSA 1.67 m2    TDI SEPTAL 0.05 m/s    LV LATERAL E/E' RATIO 6.10 m/s    LV SEPTAL E/E' RATIO 12.20 m/s    LA WIDTH 4.43 cm    IVC diameter 1.3 cm    Left Ventricular Outflow Tract Mean Velocity 0.1672087870 cm/s    Left Ventricular Outflow Tract Mean Gradient 3.81 mmHg    TV mean gradient 15 mmHg    AORTIC VALVE CUSP SEPERATION 0 cm    TDI LATERAL 0.10 m/s    LVIDd 3.59 3.5 - 6.0 cm    IVS 1.18 (A) 0.6 - 1.1 cm    Posterior Wall 1.12 (A) 0.6 - 1.1 cm    Ao root annulus 2.81 cm    LVIDs 2.15 2.1 - 4.0 cm    FS 40 28 - 44 %    STJ 3.21 cm    Ascending aorta 3.24 cm    LV mass 132.11 g    LA size 3.33 cm    TAPSE 2.10 cm    Left Ventricle Relative Wall Thickness 0.62 cm    AV mean gradient 6 mmHg    AV valve area 2.37 cm2    AV Velocity Ratio 0.72     AV index (prosthetic) 0.77     MV valve area p 1/2 method 3.20 cm2    E/A ratio 0.91     Mean e' 0.08 m/s    E wave deceleration time [id number]  msec    IVRT 79.442499917911162 msec    LVOT diameter 1.98 cm    LVOT area 3.1 cm2    LVOT peak jack 1.16 m/s    LVOT peak VTI 23.70 cm    Ao peak jack 1.61 m/s    Ao VTI 30.8 cm    RVOT peak jack 1.02 m/s    RVOT peak VTI 23.9 cm    LVOT stroke volume 72.94 cm3    AV peak gradient 10 mmHg    PV mean gradient 2.85 mmHg    E/E' ratio 8.13 m/s    MV Peak E Jack 0.61 m/s    TR Max Jack 2.43 m/s    MV stenosis pressure 1/2 time 68.991351788911090 ms    MV Peak A Jack 0.67 m/s    LV Systolic Volume 15.38 mL    LV Systolic Volume Index 9.4 mL/m2    LV Diastolic Volume 53.92 mL    LV Diastolic Volume Index 32.88 mL/m2    LV Mass Index 81 g/m2    Echo EF Estimated 71 %    RA Major Axis 3.43 cm    Left Atrium Minor Axis 3.50 cm    Triscuspid Valve Regurgitation Peak Gradient 24 mmHg    Right Atrial Pressure (from IVC) 3 mmHg    EF 65 %    TV rest pulmonary artery pressure 27 mmHg    Narrative    · The left ventricle is normal in size with normal systolic function.  · The estimated ejection fraction is 65%.  · Normal left ventricular diastolic function.  · Normal right ventricular size with normal right ventricular systolic   function.  · The estimated PA systolic pressure is 27 mmHg.        Assessment and Plan:     History of PTCA  See angina section.    CAD in native artery  See angina section.    AP (angina pectoris)  Increase Imdur to 60 mg qd.  Optimize med tx for CAD.    Nuclear stress test today.    Abnormal ECG  Chronic abnormalities noted.    Hyperlipidemia  Statin tx.    Hypertension  Continue current HTN meds.        VTE Risk Mitigation (From admission, onward)         Ordered     enoxaparin injection 40 mg  Daily         04/05/22 1621     IP VTE HIGH RISK PATIENT  Once         04/05/22 1621     Place sequential compression device  Until discontinued         04/05/22 1621                Thank you for your consult.    Papa Connor MD  Cardiology   [facility]

## 2022-04-06 NOTE — PLAN OF CARE
Patient AAOX 4. VSS.. Patient remained afebrile throughout shift.   Patient remained free of falls this shift   Patient denies c/o pain this shift    blood glucose monitored, corrected via SSI.   Plan of care reviewed. Patient verbalized understanding.   Patient moving/turning independently. Frequent weight shifting encouraged.   Patient SB on monitor   Bed low, side rails up x2, wheels locked, call light in reach. Bed alarm maintained for safety. Patient instructed to call for assistance.   Hourly rounding completed.   Will continue to monitor.

## 2022-04-07 NOTE — PROGRESS NOTES
"[facility]  Hospital Medicine  Progress Note    Patient Name: Odalis Rosas  MRN: [id number]  Patient Class: OP- Observation   Admission Date: 4/5/2022  Length of Stay: 0 days  Attending Physician: No att. providers found  Primary Care Provider: Carlton Calderon MD    Subjective:     Principal Problem:Chest pain        HPI:  Odalis Rosas is a 76-year-old  female with a PMHx of Hypertension, CAD, Hyperlipidemia and DM II who presented to the ED with c/o substernal chest pain that started a few hours PTA. Pain is moderate in intensity, radiating to her L ear which she describes as feeling "like toothpicks in your ear", and nonexertional. States the pain was 10/10 at the time of onset. Associated symptoms include feeling hot and nauseated. Denies any SOB/GONSALES, V/D, neck or jaw pain, upper extremity pain, numbness/tingling, palpitations, cough, ABD pain, back pain, HA, lightheadedness, dizziness, syncope, weakness, fever or chills. She took 81 mg ASA at home which resolved her chest pain. At present her chest pain is 5/10 and the radiating L ear pain has resolved. Initial work-up showed: Troponin 0.010, , unremarkable CXR, and EKG showed sinus bradycardia with sinus arrhythmia, possible left atrial enlargement, left anterior fascicular block, LVH, Cannot rule out Septal infarct. Case was discussed with Cardiology (Nikolas) per the ED. Hospital Medicine was contacted for admission and patient placed in Observation. Full code status and SDM is her daughter, Indigo Deal, at [phone].       Overview/Hospital Course:  Odalis Rosas is a 76 year old female who was placed in observation for further evaluation of chest pain. Serial troponin were negative. EKG showed sinus with inferior T wave abnormalities, possible old septal infarct. Echocardiogram shows normal LV function without WMA. Nuclear stress test ordered which was negative for myocardial ischemia. Imdur increased to 60mg daily. She " was asked to follow up with her primary Cardiologist.       No new subjective & objective note has been filed under this hospital service since the last note was generated.      Assessment/Plan:      * Chest pain  - Last saw An (Cards) in February 2020 - states she has not f/u'd d/t COVID.  - Troponin 0.010. EKG showed sinus bradycardia with sinus arrhythmia. CP now 5/10, but stable.   - Tele monitor    - ASA, Imdur, Norvasc and statin.    - Trend serial cardiac enzymes and EKG.  - Check FLP.  - Will obtain 2D echo.  - Cardiology consult (Connor pepe)    - NPO p MN    - Pt high risk for CAD but has not been previously diagnosed  - F/U An outpatient once discharged       Type 2 diabetes, controlled, with neuropathy  Hemoglobin A1C   Date Value Ref Range Status   10/22/2021 6.0 % Final   07/22/2021 6.3 % Final   03/26/2021 6.0 % Final     -Does not take oral antidiabetics or insulin. Controlled with diet.  - Accuchecks with low dose SSI.  - Diabetic diet.  - Check HbA1c.   -Continue gabapentin    Mild episode of recurrent major depressive disorder  -Continue paxil       Hyperlipidemia  -FLP   -Continue atorvastatin       Hypertension  -BP well-controlled  -BP checks q4hr   -Continue antihypertensives         VTE Risk Mitigation (From admission, onward)         Ordered     IP VTE HIGH RISK PATIENT  Once         04/05/22 1621                Discharge Planning   MACARENA: 4/6/2022     Code Status: Prior   Is the patient medically ready for discharge?:     Reason for patient still in hospital (select all that apply): Treatment  Discharge Plan A: Home          Amaris Tsang NP  Department of Hospital Medicine   [facility]

## 2022-04-07 NOTE — DISCHARGE SUMMARY
"[facility]  Hospital Medicine  Discharge Summary      Patient Name: Odalis Rosas  MRN: [id number]  Patient Class: OP- Observation  Admission Date: 4/5/2022  Hospital Length of Stay: 0 days  Discharge Date and Time:  04/07/2022 4:56 PM  Attending Physician: No att. providers found   Discharging Provider: Amaris Tsang NP  Primary Care Provider: Carlton Calderon MD      HPI:   Odalis Rosas is a 76-year-old  female with a PMHx of Hypertension, CAD, Hyperlipidemia and DM II who presented to the ED with c/o substernal chest pain that started a few hours PTA. Pain is moderate in intensity, radiating to her L ear which she describes as feeling "like toothpicks in your ear", and nonexertional. States the pain was 10/10 at the time of onset. Associated symptoms include feeling hot and nauseated. Denies any SOB/GONSALES, V/D, neck or jaw pain, upper extremity pain, numbness/tingling, palpitations, cough, ABD pain, back pain, HA, lightheadedness, dizziness, syncope, weakness, fever or chills. She took 81 mg ASA at home which resolved her chest pain. At present her chest pain is 5/10 and the radiating L ear pain has resolved. Initial work-up showed: Troponin 0.010, , unremarkable CXR, and EKG showed sinus bradycardia with sinus arrhythmia, possible left atrial enlargement, left anterior fascicular block, LVH, Cannot rule out Septal infarct. Case was discussed with Cardiology (Nikolas) per the ED. Hospital Medicine was contacted for admission and patient placed in Observation. Full code status and SDM is her daughter, Indigo Deal, at [phone].       * No surgery found *      Hospital Course:   Odalis Rosas is a 76 year old female who was placed in observation for further evaluation of chest pain. Serial troponin were negative. EKG showed sinus with inferior T wave abnormalities, possible old septal infarct. Echocardiogram shows normal LV function without WMA. Nuclear stress test ordered " which was negative for myocardial ischemia. Imdur increased to 60mg daily. She was asked to follow up with her primary Cardiologist.        Goals of Care Treatment Preferences:  Code Status: Full Code      Consults:   Consults (From admission, onward)        Status Ordering Provider     Inpatient consult to Cardiology  Once        Provider:  MD Laisha Bernal PAMELA A.          No new Assessment & Plan notes have been filed under this hospital service since the last note was generated.  Service: Hospital Medicine    Final Active Diagnoses:    Diagnosis Date Noted POA    PRINCIPAL PROBLEM:  Chest pain [R07.9] 04/05/2022 Yes    Abnormal ECG [R94.31] 04/06/2022 Yes    AP (angina pectoris) [I20.9] 04/06/2022 Yes    CAD in native artery [I25.10] 04/06/2022 Yes    History of PTCA [Z98.61] 04/06/2022 Not Applicable    Type 2 diabetes, controlled, with neuropathy [E11.40] 04/05/2022 Yes    Mild episode of recurrent major depressive disorder [F33.0] 03/09/2020 Yes    Hypertension [I10] 12/19/2019 Yes    Hyperlipidemia [E78.5] 12/19/2019 Yes      Problems Resolved During this Admission:       Discharged Condition: stable    Disposition: Home or Self Care    Follow Up:   Follow-up Information     Carlton Calderon MD. Go in 1 week(s).    Specialty: Family Medicine  Contact information:  [address] [facility] [address]  [phone]                       Patient Instructions:   No discharge procedures on file.    Significant Diagnostic Studies: Labs:   CMP   Recent Labs   Lab 04/06/22  0506      K 4.9      CO2 25   GLU 93   BUN 23   CREATININE 1.2   CALCIUM 9.5   PROT 7.0   ALBUMIN 3.7   BILITOT 0.7   ALKPHOS 128   AST 24   ALT 14   ANIONGAP 12   ESTGFRAFRICA 51*   EGFRNONAA 45*    and CBC   Recent Labs   Lab 04/06/22  0506   WBC 5.28   HGB 13.1   HCT 39.3          Pending Diagnostic Studies:     None         Medications:  Reconciled Home Medications:       Medication List      CHANGE how you take these medications    isosorbide mononitrate 60 MG 24 hr tablet  Commonly known as: IMDUR  Take 1 tablet (60 mg total) by mouth once daily.  What changed:   · medication strength  · how much to take        CONTINUE taking these medications    aluminum-magnesium hydroxide-simethicone 200-200-20 mg/5 mL Susp  Commonly known as: MAALOX  Take 30 mLs by mouth 4 (four) times daily before meals and nightly.     aspirin 81 MG Chew  Take 81 mg by mouth once daily.     atorvastatin 20 MG tablet  Commonly known as: LIPITOR  TAKE ONE TABLET BY MOUTH EVERY EVENING     CLEARLAX 17 gram/dose powder  Generic drug: polyethylene glycol  MIX 1 CAPFULL IN 4 OUNCE OF LIQUID ONCE DAILY OR EVERY OTHER DAY AS NEEDED FOR CONSTIPATION     desonide 0.05 % lotion  Commonly known as: DESOWEN  Apply topically 2 (two) times daily as needed (rash).     fluticasone propionate 50 mcg/actuation nasal spray  Commonly known as: FLONASE  USE TWO SPRAYS INTO EACH NOSTRIL ONCE DAILY AS NEEDED FOR RHINITIS     gabapentin 100 MG capsule  Commonly known as: NEURONTIN  Take 1 capsule (100 mg total) by mouth every evening.     latanoprost 0.005 % ophthalmic solution  1 drop every evening.     levocetirizine 5 MG tablet  Commonly known as: XYZAL  TAKE ONE TABLET BY MOUTH ONCE DAILY     meloxicam 7.5 MG tablet  Commonly known as: MOBIC  Take 1 tablet (7.5 mg total) by mouth 2 (two) times daily as needed for Pain.     montelukast 10 mg tablet  Commonly known as: SINGULAIR  TAKE ONE TABLET BY MOUTH ONCE DAILY AS NEEDED FOR ALLERGIES     mupirocin 2 % ointment  Commonly known as: BACTROBAN  Apply topically 3 (three) times daily as needed (open sores).     nitrofurantoin (macrocrystal-monohydrate) 100 MG capsule  Commonly known as: MACROBID  Take 1 capsule (100 mg total) by mouth 2 (two) times daily.     omeprazole 20 MG capsule  Commonly known as: PRILOSEC  Take 1 capsule (20 mg total) by mouth once daily.     paroxetine  20 MG tablet  Commonly known as: PAXIL  TAKE ONE TABLET BY MOUTH EVERY MORNING     valsartan 160 MG tablet  Commonly known as: DIOVAN  Take 320 mg by mouth once daily.     verapamiL 240 MG C24p  Commonly known as: VERELAN  TAKE 1 CAPSULE BY MOUTH EVERY EVENING        STOP taking these medications    amLODIPine 2.5 MG tablet  Commonly known as: NORVASC            Indwelling Lines/Drains at time of discharge:   Lines/Drains/Airways     None                 Time spent on the discharge of patient: >35 minutes         Amaris Tsang NP  Department of Hospital Medicine  [facility]

## 2022-04-07 NOTE — HOSPITAL COURSE
Odalis Rosas is a 76 year old female who was placed in observation for further evaluation of chest pain. Serial troponin were negative. EKG showed sinus with inferior T wave abnormalities, possible old septal infarct. Echocardiogram shows normal LV function without WMA. Nuclear stress test ordered which was negative for myocardial ischemia. Imdur increased to 60mg daily. She was asked to follow up with her primary Cardiologist.

## 2022-07-08 DIAGNOSIS — Z13.9 SCREENING PROCEDURE: Primary | ICD-10-CM

## 2025-02-28 ENCOUNTER — HOSPITAL ENCOUNTER (OUTPATIENT)
Dept: RADIOLOGY | Facility: HOSPITAL | Age: 80
Discharge: HOME | End: 2025-02-28
Attending: NURSE PRACTITIONER

## 2025-02-28 DIAGNOSIS — R59.0 VIRCHOW'S NODE: Primary | ICD-10-CM

## 2025-02-28 DIAGNOSIS — R59.0 CERVICAL LYMPHADENOPATHY: ICD-10-CM

## 2025-02-28 PROCEDURE — 76536 US EXAM OF HEAD AND NECK: CPT | Mod: TC,PO
